# Patient Record
Sex: MALE | Race: ASIAN | NOT HISPANIC OR LATINO | Employment: FULL TIME | URBAN - METROPOLITAN AREA
[De-identification: names, ages, dates, MRNs, and addresses within clinical notes are randomized per-mention and may not be internally consistent; named-entity substitution may affect disease eponyms.]

---

## 2017-04-14 ENCOUNTER — GENERIC CONVERSION - ENCOUNTER (OUTPATIENT)
Dept: OTHER | Facility: OTHER | Age: 37
End: 2017-04-14

## 2017-04-14 ENCOUNTER — ALLSCRIPTS OFFICE VISIT (OUTPATIENT)
Dept: OTHER | Facility: OTHER | Age: 37
End: 2017-04-14

## 2017-04-14 DIAGNOSIS — R35.0 FREQUENCY OF MICTURITION: ICD-10-CM

## 2017-04-14 DIAGNOSIS — R39.15 URGENCY OF URINATION: ICD-10-CM

## 2017-04-14 LAB
CLARITY UR: NORMAL
COLOR UR: YELLOW
GLUCOSE (HISTORICAL): NORMAL
HGB UR QL STRIP.AUTO: NORMAL
KETONES UR STRIP-MCNC: NORMAL MG/DL
LEUKOCYTE ESTERASE UR QL STRIP: NORMAL
NITRITE UR QL STRIP: NORMAL
PH UR STRIP.AUTO: 6.5 [PH]
PROT UR STRIP-MCNC: NORMAL MG/DL
SP GR UR STRIP.AUTO: 1.02

## 2017-04-16 LAB
CULTURE RESULT (HISTORICAL): NORMAL
MISCELLANEOUS LAB TEST RESULT (HISTORICAL): NO GROWTH

## 2017-05-11 ENCOUNTER — HOSPITAL ENCOUNTER (OUTPATIENT)
Dept: ULTRASOUND IMAGING | Facility: HOSPITAL | Age: 37
Discharge: HOME | End: 2017-05-11
Attending: UROLOGY
Payer: COMMERCIAL

## 2017-05-11 DIAGNOSIS — R35.0 FREQUENCY OF MICTURITION: ICD-10-CM

## 2017-05-11 PROCEDURE — 76770 US EXAM ABDO BACK WALL COMP: CPT

## 2017-05-11 PROCEDURE — 51798 US URINE CAPACITY MEASURE: CPT

## 2017-06-29 ENCOUNTER — ALLSCRIPTS OFFICE VISIT (OUTPATIENT)
Dept: OTHER | Facility: OTHER | Age: 37
End: 2017-06-29

## 2017-06-29 LAB
BILIRUB UR QL STRIP: NORMAL
CLARITY UR: NORMAL
COLOR UR: YELLOW
GLUCOSE (HISTORICAL): NORMAL
HGB UR QL STRIP.AUTO: NORMAL
KETONES UR STRIP-MCNC: NORMAL MG/DL
LEUKOCYTE ESTERASE UR QL STRIP: NORMAL
NITRITE UR QL STRIP: NORMAL
PH UR STRIP.AUTO: 6.5 [PH]
PROT UR STRIP-MCNC: NORMAL MG/DL
SP GR UR STRIP.AUTO: 1.01
UROBILINOGEN UR QL STRIP.AUTO: NORMAL

## 2018-01-12 VITALS
WEIGHT: 135.38 LBS | HEART RATE: 74 BPM | BODY MASS INDEX: 21.25 KG/M2 | DIASTOLIC BLOOD PRESSURE: 60 MMHG | HEIGHT: 67 IN | SYSTOLIC BLOOD PRESSURE: 102 MMHG

## 2018-01-12 VITALS
HEART RATE: 70 BPM | SYSTOLIC BLOOD PRESSURE: 122 MMHG | DIASTOLIC BLOOD PRESSURE: 84 MMHG | BODY MASS INDEX: 20.92 KG/M2 | HEIGHT: 68 IN | WEIGHT: 138 LBS

## 2018-03-08 ENCOUNTER — TRANSCRIBE ORDERS (OUTPATIENT)
Dept: RADIOLOGY | Facility: CLINIC | Age: 38
End: 2018-03-08

## 2018-03-08 ENCOUNTER — APPOINTMENT (OUTPATIENT)
Dept: RADIOLOGY | Facility: CLINIC | Age: 38
End: 2018-03-08
Payer: COMMERCIAL

## 2018-03-08 DIAGNOSIS — M25.551 RIGHT HIP PAIN: ICD-10-CM

## 2018-03-08 DIAGNOSIS — M25.551 RIGHT HIP PAIN: Primary | ICD-10-CM

## 2018-03-08 PROCEDURE — 73502 X-RAY EXAM HIP UNI 2-3 VIEWS: CPT

## 2018-07-06 ENCOUNTER — OFFICE VISIT (OUTPATIENT)
Dept: UROLOGY | Facility: CLINIC | Age: 38
End: 2018-07-06
Payer: COMMERCIAL

## 2018-07-06 VITALS
DIASTOLIC BLOOD PRESSURE: 80 MMHG | SYSTOLIC BLOOD PRESSURE: 120 MMHG | WEIGHT: 137.8 LBS | HEIGHT: 68 IN | BODY MASS INDEX: 20.88 KG/M2 | HEART RATE: 62 BPM

## 2018-07-06 DIAGNOSIS — R39.15 URINARY URGENCY: Primary | ICD-10-CM

## 2018-07-06 DIAGNOSIS — R35.0 INCREASED FREQUENCY OF URINATION: ICD-10-CM

## 2018-07-06 PROCEDURE — 99213 OFFICE O/P EST LOW 20 MIN: CPT | Performed by: PHYSICIAN ASSISTANT

## 2018-07-06 PROCEDURE — 51798 US URINE CAPACITY MEASURE: CPT | Performed by: PHYSICIAN ASSISTANT

## 2018-07-06 RX ORDER — SIMVASTATIN 10 MG
1 TABLET ORAL DAILY
COMMUNITY
Start: 2018-06-12 | End: 2021-03-26

## 2018-07-06 NOTE — PROGRESS NOTES
1  Urinary urgency     2  Increased frequency of urination         Assessment and plan:       1  Urinary urgency and frequency- managed by dr Nidhi Restrepo  - I again reviewe with the patient that he is emptying his bladder excellently  We discussed symptom management with avoidance of bladder irritants, timed voiding, and double voiding as needed  We briefly discussed initiation of anticholinergics however patient symptoms are not severe enough at this time and he is not interested in initiation of medication  Patient will follow up with Urology on an as-needed basis  He is aware to contact us in the future should his symptoms worsen  All questions answered  Stan Sadler PA-C      Chief Complaint     F/u LUTS    History of Present Illness     Pee Blank is a 43 y o  male patient of Dr Anabella Moise with a history of lower urinary tract symptoms presenting for follow-up  Patient seen urinary symptoms in the past include urinary frequency, urgency, and weak stream   Patient has reported need for severe urgency to the bathroom and the need to double void  Patient has had an ultrasound of his kidney and bladder which was negative for any urologic abnormalities  Patient has demonstrated excellent bladder emptying on postvoid imaging  Over the past year patient states his symptoms remain intermittent in nature  The most part he feels like he has an okay stream, feels empty after urination, denies nocturia  He will admit to urinary urgency every few days  He denies any urinary incontinence  He denies any precipitating or alleviating factors  Denies any dysuria, gross hematuria, suprapubic pressure flank pain, fevers, chills, bone pain, weight loss      Postvoid residual my office today reveals excellent bladder emptying at 18 mL      Laboratory         Review of Systems     Review of Systems   Constitutional: Negative for activity change, appetite change, chills, diaphoresis, fatigue, fever and unexpected weight change  Respiratory: Negative for chest tightness and shortness of breath  Cardiovascular: Negative for chest pain, palpitations and leg swelling  Gastrointestinal: Negative for abdominal distention, abdominal pain, constipation, diarrhea, nausea and vomiting  Genitourinary: Positive for frequency and urgency  Negative for decreased urine volume, difficulty urinating, dysuria, enuresis, flank pain, genital sores and hematuria  Musculoskeletal: Negative for back pain, gait problem and myalgias  Skin: Negative for color change, pallor, rash and wound  Psychiatric/Behavioral: Negative for behavioral problems  The patient is not nervous/anxious  Allergies     No Known Allergies    Physical Exam     Physical Exam   Constitutional: He is oriented to person, place, and time  He appears well-developed and well-nourished  No distress  HENT:   Head: Normocephalic and atraumatic  Eyes: Conjunctivae are normal    Neck: Normal range of motion  No tracheal deviation present  Pulmonary/Chest: Effort normal    Musculoskeletal: Normal range of motion  He exhibits no edema or deformity  Neurological: He is alert and oriented to person, place, and time  Skin: Skin is warm and dry  No rash noted  He is not diaphoretic  No erythema  Psychiatric: He has a normal mood and affect  His behavior is normal          Vital Signs     Vitals:    07/06/18 1258   BP: 120/80   BP Location: Left arm   Patient Position: Sitting   Cuff Size: Adult   Pulse: 62   Weight: 62 5 kg (137 lb 12 8 oz)   Height: 5' 8" (1 727 m)         Current Medications       Current Outpatient Prescriptions:     simvastatin (ZOCOR) 10 mg tablet, , Disp: , Rfl:       Active Problems     Patient Active Problem List   Diagnosis    Urinary urgency    Increased frequency of urination         Past Medical History     History reviewed  No pertinent past medical history        Surgical History     History reviewed  No pertinent surgical history        Family History     Family History   Problem Relation Age of Onset    Stroke Father          Social History     Social History       Radiology

## 2019-04-05 ENCOUNTER — CONSULT (OUTPATIENT)
Dept: HEMATOLOGY ONCOLOGY | Facility: CLINIC | Age: 39
End: 2019-04-05
Payer: COMMERCIAL

## 2019-04-05 VITALS
SYSTOLIC BLOOD PRESSURE: 114 MMHG | WEIGHT: 135 LBS | BODY MASS INDEX: 20.46 KG/M2 | RESPIRATION RATE: 16 BRPM | HEIGHT: 68 IN | DIASTOLIC BLOOD PRESSURE: 76 MMHG | TEMPERATURE: 97.9 F | HEART RATE: 89 BPM

## 2019-04-05 DIAGNOSIS — D70.9 NEUTROPENIA, UNSPECIFIED TYPE (HCC): Primary | ICD-10-CM

## 2019-04-05 PROCEDURE — 99244 OFF/OP CNSLTJ NEW/EST MOD 40: CPT | Performed by: INTERNAL MEDICINE

## 2019-04-05 RX ORDER — FERROUS SULFATE 325(65) MG
325 TABLET ORAL 2 TIMES WEEKLY
COMMUNITY
End: 2021-03-02 | Stop reason: ALTCHOICE

## 2019-04-11 ENCOUNTER — HOSPITAL ENCOUNTER (OUTPATIENT)
Dept: ULTRASOUND IMAGING | Facility: HOSPITAL | Age: 39
Discharge: HOME | End: 2019-04-11
Attending: INTERNAL MEDICINE
Payer: COMMERCIAL

## 2019-04-11 DIAGNOSIS — D70.9 NEUTROPENIA, UNSPECIFIED TYPE (HCC): ICD-10-CM

## 2019-04-11 PROCEDURE — 76705 ECHO EXAM OF ABDOMEN: CPT

## 2019-04-16 LAB
ANA SER QL IF: NEGATIVE
BASOPHILS # BLD AUTO: 20 CELLS/UL (ref 0–200)
BASOPHILS NFR BLD AUTO: 0.7 %
EOSINOPHIL # BLD AUTO: 41 CELLS/UL (ref 15–500)
EOSINOPHIL NFR BLD AUTO: 1.4 %
ERYTHROCYTE [DISTWIDTH] IN BLOOD BY AUTOMATED COUNT: 12.8 % (ref 11–15)
ERYTHROCYTE [SEDIMENTATION RATE] IN BLOOD BY WESTERGREN METHOD: 6 MM/H
HCT VFR BLD AUTO: 43.3 % (ref 38.5–50)
HGB BLD-MCNC: 14.8 G/DL (ref 13.2–17.1)
LDH SERPL-CCNC: 132 U/L (ref 100–220)
LYMPHOCYTES # BLD AUTO: 1070 CELLS/UL (ref 850–3900)
LYMPHOCYTES NFR BLD AUTO: 36.9 %
MCH RBC QN AUTO: 29.3 PG (ref 27–33)
MCHC RBC AUTO-ENTMCNC: 34.2 G/DL (ref 32–36)
MCV RBC AUTO: 85.7 FL (ref 80–100)
MONOCYTES # BLD AUTO: 249 CELLS/UL (ref 200–950)
MONOCYTES NFR BLD AUTO: 8.6 %
NEUTROPHILS # BLD AUTO: 1520 CELLS/UL (ref 1500–7800)
NEUTROPHILS NFR BLD AUTO: 52.4 %
PLATELET # BLD AUTO: 191 THOUSAND/UL (ref 140–400)
PMV BLD REES-ECKER: 9.5 FL (ref 7.5–12.5)
RBC # BLD AUTO: 5.05 MILLION/UL (ref 4.2–5.8)
RHEUMATOID FACT SERPL-ACNC: <14 IU/ML
WBC # BLD AUTO: 2.9 THOUSAND/UL (ref 3.8–10.8)

## 2019-04-30 ENCOUNTER — DOCUMENTATION (OUTPATIENT)
Dept: HEMATOLOGY ONCOLOGY | Facility: CLINIC | Age: 39
End: 2019-04-30

## 2021-02-27 LAB
ALT SERPL-CCNC: 13 U/L (ref 9–46)
AST SERPL-CCNC: 19 U/L (ref 10–40)
BASOPHILS # BLD AUTO: 32 CELLS/UL (ref 0–200)
BASOPHILS NFR BLD AUTO: 0.9 %
CHOLEST SERPL-MCNC: 179 MG/DL
CHOLEST/HDLC SERPL: 2.9 (CALC)
CK SERPL-CCNC: 281 U/L (ref 44–196)
EOSINOPHIL # BLD AUTO: 60 CELLS/UL (ref 15–500)
EOSINOPHIL NFR BLD AUTO: 1.7 %
ERYTHROCYTE [DISTWIDTH] IN BLOOD BY AUTOMATED COUNT: 12.9 % (ref 11–15)
HCT VFR BLD AUTO: 43.1 % (ref 38.5–50)
HDLC SERPL-MCNC: 62 MG/DL
HGB BLD-MCNC: 14.6 G/DL (ref 13.2–17.1)
LDLC SERPL CALC-MCNC: 103 MG/DL (CALC)
LYMPHOCYTES # BLD AUTO: 1250 CELLS/UL (ref 850–3900)
LYMPHOCYTES NFR BLD AUTO: 35.7 %
MCH RBC QN AUTO: 28.7 PG (ref 27–33)
MCHC RBC AUTO-ENTMCNC: 33.9 G/DL (ref 32–36)
MCV RBC AUTO: 84.8 FL (ref 80–100)
MONOCYTES # BLD AUTO: 424 CELLS/UL (ref 200–950)
MONOCYTES NFR BLD AUTO: 12.1 %
NEUTROPHILS # BLD AUTO: 1736 CELLS/UL (ref 1500–7800)
NEUTROPHILS NFR BLD AUTO: 49.6 %
NONHDLC SERPL-MCNC: 117 MG/DL (CALC)
PLATELET # BLD AUTO: 205 THOUSAND/UL (ref 140–400)
PMV BLD REES-ECKER: 9.5 FL (ref 7.5–12.5)
RBC # BLD AUTO: 5.08 MILLION/UL (ref 4.2–5.8)
TRIGL SERPL-MCNC: 58 MG/DL
WBC # BLD AUTO: 3.5 THOUSAND/UL (ref 3.8–10.8)

## 2021-02-28 PROBLEM — D72.819 LEUKOPENIA: Status: ACTIVE | Noted: 2021-02-28

## 2021-02-28 PROBLEM — E61.1 IRON DEFICIENCY: Status: ACTIVE | Noted: 2021-02-28

## 2021-02-28 PROBLEM — R39.15 URINARY URGENCY: Status: RESOLVED | Noted: 2017-04-14 | Resolved: 2021-02-28

## 2021-02-28 PROBLEM — D70.9 NEUTROPENIA (HCC): Status: RESOLVED | Noted: 2019-04-05 | Resolved: 2021-02-28

## 2021-02-28 PROBLEM — K21.9 GE REFLUX: Status: ACTIVE | Noted: 2021-02-28

## 2021-02-28 PROBLEM — E78.00 HYPERCHOLESTEROLEMIA: Status: ACTIVE | Noted: 2021-02-28

## 2021-02-28 PROBLEM — R35.0 INCREASED FREQUENCY OF URINATION: Status: RESOLVED | Noted: 2017-04-14 | Resolved: 2021-02-28

## 2021-03-02 ENCOUNTER — OFFICE VISIT (OUTPATIENT)
Dept: INTERNAL MEDICINE CLINIC | Facility: CLINIC | Age: 41
End: 2021-03-02
Payer: COMMERCIAL

## 2021-03-02 VITALS
BODY MASS INDEX: 21.66 KG/M2 | DIASTOLIC BLOOD PRESSURE: 80 MMHG | TEMPERATURE: 98.2 F | OXYGEN SATURATION: 98 % | WEIGHT: 138 LBS | HEART RATE: 70 BPM | SYSTOLIC BLOOD PRESSURE: 122 MMHG | HEIGHT: 67 IN

## 2021-03-02 DIAGNOSIS — D72.819 LEUKOPENIA, UNSPECIFIED TYPE: ICD-10-CM

## 2021-03-02 DIAGNOSIS — Z00.00 ANNUAL PHYSICAL EXAM: Primary | ICD-10-CM

## 2021-03-02 DIAGNOSIS — R22.9 LUMP OF SKIN: ICD-10-CM

## 2021-03-02 DIAGNOSIS — E61.1 IRON DEFICIENCY: ICD-10-CM

## 2021-03-02 DIAGNOSIS — E78.00 HYPERCHOLESTEROLEMIA: ICD-10-CM

## 2021-03-02 PROCEDURE — 99396 PREV VISIT EST AGE 40-64: CPT | Performed by: INTERNAL MEDICINE

## 2021-03-02 PROCEDURE — 3725F SCREEN DEPRESSION PERFORMED: CPT | Performed by: INTERNAL MEDICINE

## 2021-03-02 PROCEDURE — 3008F BODY MASS INDEX DOCD: CPT | Performed by: INTERNAL MEDICINE

## 2021-03-02 PROCEDURE — 1036F TOBACCO NON-USER: CPT | Performed by: INTERNAL MEDICINE

## 2021-03-02 NOTE — PATIENT INSTRUCTIONS

## 2021-03-02 NOTE — ASSESSMENT & PLAN NOTE
His cholesterol 179, triglyceride 58,   Advised to continue present medication for  low-cholesterol diet

## 2021-03-02 NOTE — PROGRESS NOTES
ADULT ANNUAL [address] [facility] INTERNAL MEDICINE    NAME: Kirt Farmer  AGE: 38 y o  SEX: male  : 1980     DATE: 3/2/2021     Assessment and Plan:     Problem List Items Addressed This Visit        Other    Hypercholesterolemia     His cholesterol 179, triglyceride 58,   Advised to continue present medication for  low-cholesterol diet  Relevant Orders    Comprehensive metabolic panel    Lipid panel    Leukopenia     Patient had a hematological evaluation in the past   WBC stable  Will follow CBC         Relevant Orders    CBC and differential    Comprehensive metabolic panel    Iron deficiency     Patient stopped taking iron therapy  Will follow CBC and ferritin  Relevant Orders    CBC and differential    Ferritin    BMI 21 0-21 9, adult    Lump of skin     On both forearms most likely lipoma  Patient had lump removal  from both arms by Doc [place], and biopsy was positive for lipoma  Will observe  Other Visit Diagnoses     Annual physical exam    -  Primary          Immunizations and preventive care screenings were discussed with patient today  Appropriate education was printed on patient's after visit summary  Counseling:  · Alcohol/drug use: discussed moderation in alcohol intake, the recommendations for healthy alcohol use, and avoidance of illicit drug use  Return in about 6 months (around 2021)  Chief Complaint:     Chief Complaint   Patient presents with    Annual Exam      History of Present Illness:     Adult Annual Physical   Patient here for a comprehensive physical exam  The patient reports no problems  Diet and Physical Activity  · Diet/Nutrition: well balanced diet  · Exercise: 3-4 times a week on average        Depression Screening  PHQ-9 Depression Screening    PHQ-9:   Frequency of the following problems over the past two weeks:      Little interest or pleasure in doing things: 0 - not at all  Feeling down, depressed, or hopeless: 0 - not at all  PHQ-2 Score: 0       General Health  · Sleep: sleeps well  · Hearing: normal - bilateral   · Vision: no vision problems  · Dental: regular dental visits   Health  · Symptoms include: none     Review of Systems:     Review of Systems   Constitutional: Negative for chills, fatigue and fever  HENT: Negative for congestion, ear pain, hearing loss, nosebleeds, sinus pain, sore throat and trouble swallowing  Eyes: Negative for discharge, redness and visual disturbance  Respiratory: Negative for cough, chest tightness and shortness of breath  Cardiovascular: Negative for chest pain and palpitations  Gastrointestinal: Negative for abdominal pain, blood in stool, constipation, diarrhea, nausea and vomiting  Genitourinary: Negative for dysuria, flank pain, frequency and hematuria  Musculoskeletal: Negative for arthralgias, myalgias and neck pain  Skin: Negative for color change and rash  Complain of scatter lump on both upper extremity   Neurological: Negative for dizziness, speech difficulty, weakness and headaches  Hematological: Does not bruise/bleed easily  Psychiatric/Behavioral: Negative for agitation and behavioral problems           Past Medical History:     Past Medical History:   Diagnosis Date    BMI 21 0-21 9, adult 3/2/2021    Chest pain, atypical     Esophageal reflux     GE reflux 2/28/2021    Hypercholesterolemia 2/28/2021    Iron deficiency 2/28/2021    Leukopenia 2/28/2021    Lump of skin 3/2/2021    Nocturia     Urinary urgency       Past Surgical History:     Past Surgical History:   Procedure Laterality Date    NO PAST SURGERIES        Family History:     Family History   Problem Relation Age of Onset    Stroke Father     Coronary artery disease Father       Social History:        Social History     Socioeconomic History    Marital status: /Civil Union     Spouse name: None    Number of children: None    Years of education: None    Highest education level: None   Occupational History    Occupation: Has own business    Social Needs    Financial resource strain: None    Food insecurity     Worry: None     Inability: None    Transportation needs     Medical: None     Non-medical: None   Tobacco Use    Smoking status: Never Smoker    Smokeless tobacco: Never Used   Substance and Sexual Activity    Alcohol use: No    Drug use: No    Sexual activity: None   Lifestyle    Physical activity     Days per week: None     Minutes per session: None    Stress: None   Relationships    Social connections     Talks on phone: None     Gets together: None     Attends Hinduism service: None     Active member of club or organization: None     Attends meetings of clubs or organizations: None     Relationship status: None    Intimate partner violence     Fear of current or ex partner: None     Emotionally abused: None     Physically abused: None     Forced sexual activity: None   Other Topics Concern    None   Social History Narrative    Annual dental checkup: Advise - As per [employer]       Current Medications:     Current Outpatient Medications   Medication Sig Dispense Refill    simvastatin (ZOCOR) 10 mg tablet Take 1 tablet by mouth daily       No current facility-administered medications for this visit  Allergies:     No Known Allergies   Physical Exam:     /80 (BP Location: Right arm, Patient Position: Sitting, Cuff Size: Adult)   Pulse 70   Temp 98 2 °F (36 8 °C) (Tympanic)   Ht 5' 7" (1 702 m)   Wt 62 6 kg (138 lb)   SpO2 98%   BMI 21 61 kg/m²     Physical Exam  Vitals signs and nursing note reviewed  Constitutional:       Appearance: He is well-developed  HENT:      Head: Normocephalic and atraumatic        Right Ear: Tympanic membrane and external ear normal       Left Ear: Tympanic membrane and external ear normal       Nose: Nose normal  Mouth/Throat:      Comments: Patient has a face mask on  Eyes:      General: No scleral icterus  Extraocular Movements: Extraocular movements intact  Conjunctiva/sclera: Conjunctivae normal    Neck:      Musculoskeletal: Normal range of motion and neck supple  Cardiovascular:      Rate and Rhythm: Normal rate and regular rhythm  Pulses: Normal pulses  Heart sounds: Normal heart sounds  No murmur  Pulmonary:      Effort: Pulmonary effort is normal  No respiratory distress  Breath sounds: Normal breath sounds  Abdominal:      General: Abdomen is flat  Palpations: Abdomen is soft  There is no mass  Tenderness: There is no abdominal tenderness  Musculoskeletal: Normal range of motion  Skin:     General: Skin is warm and dry  Comments: Has small lump on both upper extremity soft, nontender somewhat mobile 1 on each forearm   Neurological:      General: No focal deficit present  Mental Status: He is alert and oriented to person, place, and time     Psychiatric:         Mood and Affect: Mood normal          Behavior: Behavior normal             Shalonda Gaxiola MD  [facility]

## 2021-03-02 NOTE — ASSESSMENT & PLAN NOTE
On both forearms most likely lipoma  Patient had lump removal 2006 from both arms by Analilia Troy, and biopsy was positive for lipoma  Will observe

## 2021-03-26 DIAGNOSIS — E78.00 PURE HYPERCHOLESTEROLEMIA, UNSPECIFIED: ICD-10-CM

## 2021-03-26 RX ORDER — SIMVASTATIN 10 MG
TABLET ORAL
Qty: 90 TABLET | Refills: 1 | Status: SHIPPED | OUTPATIENT
Start: 2021-03-26 | End: 2021-10-19

## 2021-04-13 DIAGNOSIS — Z23 ENCOUNTER FOR IMMUNIZATION: ICD-10-CM

## 2021-06-03 ENCOUNTER — TELEPHONE (OUTPATIENT)
Dept: INTERNAL MEDICINE CLINIC | Facility: CLINIC | Age: 41
End: 2021-06-03

## 2021-06-21 DIAGNOSIS — J02.9 PHARYNGITIS, UNSPECIFIED ETIOLOGY: Primary | ICD-10-CM

## 2021-06-21 RX ORDER — AZITHROMYCIN 500 MG/1
500 TABLET, FILM COATED ORAL DAILY
Qty: 3 TABLET | Refills: 0 | Status: SHIPPED | OUTPATIENT
Start: 2021-06-21 | End: 2021-06-22 | Stop reason: SDUPTHER

## 2021-06-21 NOTE — PROGRESS NOTES
Complaining of nasal congestion and sore throat for past 4 days  It is not getting any better  Some body ache  No dyspnea or chest pain  No cough  No headache  He feels some pressure in the sinus 2  Symptomatic treatment is not helping  Sometimes yellow nasal discharge  Temperature normal   Start azithromycin

## 2021-06-22 ENCOUNTER — DOCUMENTATION (OUTPATIENT)
Dept: FAMILY MEDICINE CLINIC | Facility: CLINIC | Age: 41
End: 2021-06-22

## 2021-06-22 DIAGNOSIS — J02.9 PHARYNGITIS, UNSPECIFIED ETIOLOGY: ICD-10-CM

## 2021-06-22 RX ORDER — AZITHROMYCIN 500 MG/1
500 TABLET, FILM COATED ORAL DAILY
Qty: 3 TABLET | Refills: 0 | Status: SHIPPED | OUTPATIENT
Start: 2021-06-22 | End: 2021-06-25

## 2021-08-26 ENCOUNTER — APPOINTMENT (EMERGENCY)
Dept: RADIOLOGY | Facility: HOSPITAL | Age: 41
End: 2021-08-26
Payer: COMMERCIAL

## 2021-08-26 ENCOUNTER — HOSPITAL ENCOUNTER (EMERGENCY)
Facility: HOSPITAL | Age: 41
Discharge: HOME | End: 2021-08-26
Attending: EMERGENCY MEDICINE
Payer: COMMERCIAL

## 2021-08-26 VITALS
SYSTOLIC BLOOD PRESSURE: 128 MMHG | HEART RATE: 70 BPM | TEMPERATURE: 98.4 F | DIASTOLIC BLOOD PRESSURE: 81 MMHG | RESPIRATION RATE: 16 BRPM | OXYGEN SATURATION: 98 %

## 2021-08-26 DIAGNOSIS — R07.9 CHEST PAIN: Primary | ICD-10-CM

## 2021-08-26 LAB
ALBUMIN SERPL BCP-MCNC: 3.7 G/DL (ref 3.5–5)
ALP SERPL-CCNC: 66 U/L (ref 46–116)
ALT SERPL W P-5'-P-CCNC: 29 U/L (ref 12–78)
ANION GAP SERPL CALCULATED.3IONS-SCNC: 5 MMOL/L (ref 4–13)
AST SERPL W P-5'-P-CCNC: 19 U/L (ref 5–45)
ATRIAL RATE: 52 BPM
ATRIAL RATE: 59 BPM
BASOPHILS # BLD AUTO: 0.02 THOUSANDS/ΜL (ref 0–0.1)
BASOPHILS NFR BLD AUTO: 1 % (ref 0–1)
BILIRUB SERPL-MCNC: 0.35 MG/DL (ref 0.2–1)
BUN SERPL-MCNC: 16 MG/DL (ref 5–25)
CALCIUM SERPL-MCNC: 9 MG/DL (ref 8.3–10.1)
CHLORIDE SERPL-SCNC: 107 MMOL/L (ref 100–108)
CO2 SERPL-SCNC: 30 MMOL/L (ref 21–32)
CREAT SERPL-MCNC: 1.08 MG/DL (ref 0.6–1.3)
EOSINOPHIL # BLD AUTO: 0.08 THOUSAND/ΜL (ref 0–0.61)
EOSINOPHIL NFR BLD AUTO: 2 % (ref 0–6)
ERYTHROCYTE [DISTWIDTH] IN BLOOD BY AUTOMATED COUNT: 12.8 % (ref 11.6–15.1)
GFR SERPL CREATININE-BSD FRML MDRD: 84 ML/MIN/1.73SQ M
GLUCOSE SERPL-MCNC: 102 MG/DL (ref 65–140)
HCT VFR BLD AUTO: 45 % (ref 36.5–49.3)
HGB BLD-MCNC: 14.9 G/DL (ref 12–17)
IMM GRANULOCYTES # BLD AUTO: 0.01 THOUSAND/UL (ref 0–0.2)
IMM GRANULOCYTES NFR BLD AUTO: 0 % (ref 0–2)
LYMPHOCYTES # BLD AUTO: 1.48 THOUSANDS/ΜL (ref 0.6–4.47)
LYMPHOCYTES NFR BLD AUTO: 42 % (ref 14–44)
MCH RBC QN AUTO: 28.4 PG (ref 26.8–34.3)
MCHC RBC AUTO-ENTMCNC: 33.1 G/DL (ref 31.4–37.4)
MCV RBC AUTO: 86 FL (ref 82–98)
MONOCYTES # BLD AUTO: 0.42 THOUSAND/ΜL (ref 0.17–1.22)
MONOCYTES NFR BLD AUTO: 12 % (ref 4–12)
NEUTROPHILS # BLD AUTO: 1.53 THOUSANDS/ΜL (ref 1.85–7.62)
NEUTS SEG NFR BLD AUTO: 43 % (ref 43–75)
NRBC BLD AUTO-RTO: 0 /100 WBCS
P AXIS: 45 DEGREES
P AXIS: 54 DEGREES
PLATELET # BLD AUTO: 182 THOUSANDS/UL (ref 149–390)
PMV BLD AUTO: 9.2 FL (ref 8.9–12.7)
POTASSIUM SERPL-SCNC: 4.4 MMOL/L (ref 3.5–5.3)
PR INTERVAL: 162 MS
PR INTERVAL: 172 MS
PROT SERPL-MCNC: 6.9 G/DL (ref 6.4–8.2)
QRS AXIS: 62 DEGREES
QRS AXIS: 64 DEGREES
QRSD INTERVAL: 76 MS
QRSD INTERVAL: 84 MS
QT INTERVAL: 416 MS
QT INTERVAL: 416 MS
QTC INTERVAL: 386 MS
QTC INTERVAL: 411 MS
RBC # BLD AUTO: 5.25 MILLION/UL (ref 3.88–5.62)
SODIUM SERPL-SCNC: 142 MMOL/L (ref 136–145)
T WAVE AXIS: 59 DEGREES
T WAVE AXIS: 70 DEGREES
TROPONIN I SERPL-MCNC: <0.02 NG/ML
TROPONIN I SERPL-MCNC: <0.02 NG/ML
VENTRICULAR RATE: 52 BPM
VENTRICULAR RATE: 59 BPM
WBC # BLD AUTO: 3.54 THOUSAND/UL (ref 4.31–10.16)

## 2021-08-26 PROCEDURE — 36415 COLL VENOUS BLD VENIPUNCTURE: CPT | Performed by: EMERGENCY MEDICINE

## 2021-08-26 PROCEDURE — 99284 EMERGENCY DEPT VISIT MOD MDM: CPT | Performed by: EMERGENCY MEDICINE

## 2021-08-26 PROCEDURE — 80053 COMPREHEN METABOLIC PANEL: CPT | Performed by: EMERGENCY MEDICINE

## 2021-08-26 PROCEDURE — 99285 EMERGENCY DEPT VISIT HI MDM: CPT

## 2021-08-26 PROCEDURE — 93010 ELECTROCARDIOGRAM REPORT: CPT | Performed by: INTERNAL MEDICINE

## 2021-08-26 PROCEDURE — 71045 X-RAY EXAM CHEST 1 VIEW: CPT

## 2021-08-26 PROCEDURE — 93005 ELECTROCARDIOGRAM TRACING: CPT

## 2021-08-26 PROCEDURE — 84484 ASSAY OF TROPONIN QUANT: CPT | Performed by: EMERGENCY MEDICINE

## 2021-08-26 PROCEDURE — 85025 COMPLETE CBC W/AUTO DIFF WBC: CPT | Performed by: EMERGENCY MEDICINE

## 2021-08-26 NOTE — DISCHARGE INSTRUCTIONS
Please make sure to follow-up with family doctor in the next 3 days  Return to ER if feeling worse or any new concerning symptoms

## 2021-08-26 NOTE — ED PROVIDER NOTES
History  Chief Complaint   Patient presents with    Chest Pain     pt woke up to go to bathroom and felt chest heaviness  feeling is resolved now       History provided by:  Patient   used: No      Patient is a 21-year-old male presenting to emergency department with chest heaviness today morning  Patient states woke up today morning, went to the bathroom, on the way back from bathroom started having chest heaviness  Self-resolved 5 minutes  Associated with mild shortness of breath  No neck or jaw pain  No lightheadedness or dizziness  Mildly sweaty  No fevers or chills  No abdominal pain  No diarrhea  No history of cardiac disease  No recent stress test or catheterization  No drug use  No smoking  No hypertension or diabetes  Has hyperlipidemia  Parents had MI in their late 46s  No PE or DVT risk factors  Wells low  PERC score 0  MDM will do cardiac evaluation, likely candidate for repeat EKG troponin if 1st ones normal      Prior to Admission Medications   Prescriptions Last Dose Informant Patient Reported? Taking?   simvastatin (ZOCOR) 10 mg tablet   No No   Sig: TAKE 1 TABLET BY MOUTH EVERY DAY      Facility-Administered Medications: None       Past Medical History:   Diagnosis Date    BMI 21 0-21 9, adult 3/2/2021    Chest pain, atypical     Esophageal reflux     GE reflux 2/28/2021    Hypercholesterolemia 2/28/2021    Iron deficiency 2/28/2021    Leukopenia 2/28/2021    Lump of skin 3/2/2021    Nocturia     Urinary urgency        Past Surgical History:   Procedure Laterality Date    NO PAST SURGERIES         Family History   Problem Relation Age of Onset    Stroke Father     Coronary artery disease Father      I have reviewed and agree with the history as documented      E-Cigarette/Vaping     E-Cigarette/Vaping Substances     Social History     Tobacco Use    Smoking status: Never Smoker    Smokeless tobacco: Never Used   Substance Use Topics    Alcohol use: No    Drug use: No       Review of Systems   Constitutional: Negative for chills, diaphoresis and fever  HENT: Negative for congestion and sore throat  Respiratory: Positive for shortness of breath  Negative for cough, wheezing and stridor  Cardiovascular: Positive for chest pain  Negative for palpitations and leg swelling  Gastrointestinal: Negative for abdominal pain, blood in stool, diarrhea, nausea and vomiting  Genitourinary: Negative for dysuria, frequency and urgency  Musculoskeletal: Negative for neck pain and neck stiffness  Skin: Negative for pallor and rash  Neurological: Negative for dizziness, syncope, weakness, light-headedness and headaches  All other systems reviewed and are negative  Physical Exam  Physical Exam  Vitals reviewed  Constitutional:       Appearance: He is well-developed  HENT:      Head: Normocephalic and atraumatic  Eyes:      Pupils: Pupils are equal, round, and reactive to light  Cardiovascular:      Rate and Rhythm: Normal rate and regular rhythm  Heart sounds: Normal heart sounds  Pulmonary:      Effort: Pulmonary effort is normal  No respiratory distress  Breath sounds: Normal breath sounds  Abdominal:      General: Bowel sounds are normal       Palpations: Abdomen is soft  Tenderness: There is no abdominal tenderness  Musculoskeletal:         General: Normal range of motion  Cervical back: Neck supple  Right lower leg: No tenderness  No edema  Left lower leg: No tenderness  No edema  Skin:     General: Skin is warm and dry  Capillary Refill: Capillary refill takes less than 2 seconds  Neurological:      General: No focal deficit present  Mental Status: He is alert and oriented to person, place, and time           Vital Signs  ED Triage Vitals [08/26/21 0701]   Temperature Pulse Respirations Blood Pressure SpO2   98 4 °F (36 9 °C) 58 18 166/87 100 %      Temp Source Heart Rate Source Patient Position - Orthostatic VS BP Location FiO2 (%)   Oral Monitor Sitting Right arm --      Pain Score       No Pain           Vitals:    08/26/21 0701 08/26/21 0900 08/26/21 1000   BP: 166/87 137/85 128/81   Pulse: 58 56 70   Patient Position - Orthostatic VS: Sitting           Visual Acuity      ED Medications  Medications - No data to display    Diagnostic Studies  Results Reviewed     Procedure Component Value Units Date/Time    Troponin I repeat in 3hrs [[id number]]  (Normal) Collected: 08/26/21 1017    Lab Status: Final result Specimen: Blood from Arm, Right Updated: 08/26/21 1049     Troponin I <0 02 ng/mL     Comprehensive metabolic panel [[id number]] Collected: 08/26/21 0732    Lab Status: Final result Specimen: Blood from Arm, Right Updated: 08/26/21 9302     Sodium 142 mmol/L      Potassium 4 4 mmol/L      Chloride 107 mmol/L      CO2 30 mmol/L      ANION GAP 5 mmol/L      BUN 16 mg/dL      Creatinine 1 08 mg/dL      Glucose 102 mg/dL      Calcium 9 0 mg/dL      AST 19 U/L      ALT 29 U/L      Alkaline Phosphatase 66 U/L      Total Protein 6 9 g/dL      Albumin 3 7 g/dL      Total Bilirubin 0 35 mg/dL      eGFR 84 ml/min/1 73sq m     Narrative:      [place] guidelines for Chronic Kidney Disease (CKD):     Stage 1 with normal or high GFR (GFR > 90 mL/min/1 73 square meters)    Stage 2 Mild CKD (GFR = 60-89 mL/min/1 73 square meters)    Stage 3A Moderate CKD (GFR = 45-59 mL/min/1 73 square meters)    Stage 3B Moderate CKD (GFR = 30-44 mL/min/1 73 square meters)    Stage 4 Severe CKD (GFR = 15-29 mL/min/1 73 square meters)    Stage 5 End Stage CKD (GFR <15 mL/min/1 73 square meters)  Note: GFR calculation is accurate only with a steady state creatinine    Troponin I [[id number]]  (Normal) Collected: 08/26/21 0732    Lab Status: Final result Specimen: Blood from Arm, Right Updated: 08/26/21 0820     Troponin I <0 02 ng/mL     CBC and differential [[id number]]  (Abnormal) Collected: 08/26/21 0732    Lab Status: Final result Specimen: Blood from Arm, Right Updated: 08/26/21 0748     WBC 3 54 Thousand/uL      RBC 5 25 Million/uL      Hemoglobin 14 9 g/dL      Hematocrit 45 0 %      MCV 86 fL      MCH 28 4 pg      MCHC 33 1 g/dL      RDW 12 8 %      MPV 9 2 fL      Platelets 249 Thousands/uL      nRBC 0 /100 WBCs      Neutrophils Relative 43 %      Immat GRANS % 0 %      Lymphocytes Relative 42 %      Monocytes Relative 12 %      Eosinophils Relative 2 %      Basophils Relative 1 %      Neutrophils Absolute 1 53 Thousands/µL      Immature Grans Absolute 0 01 Thousand/uL      Lymphocytes Absolute 1 48 Thousands/µL      Monocytes Absolute 0 42 Thousand/µL      Eosinophils Absolute 0 08 Thousand/µL      Basophils Absolute 0 02 Thousands/µL                  XR chest 1 view portable   ED Interpretation by Camille Parker MD (08/26 1890)   No acute abnormality      Final Result by Eduard Hunt MD (08/26 2030)      No acute cardiopulmonary disease  Workstation performed: [id number]                    Procedures  Procedures         ED Course  ED Course as of Aug 26 1411   Thu Aug 26, 2021   0725 ECG shows rate of 52, sinus, normal axis, normal QRS, no significant ST or T-wave changes, normal intervals, independently interpreted by me      0822 Patient reports his white count is always low  This is normal for him  WBC(!): 3 54   0911 Patient re-evaluated  No complaints        1026 Repeat ECG shows rate of 59, sinus, normal axis, normal QRS, no significant ST or T-wave changes, normal intervals, no significant changes from previous EKG from 3 hours ago, independently interpreted by me                HEART Risk Score      Most Recent Value   Heart Score Risk Calculator   History  0 Filed at: 08/26/2021 1052   ECG  1 Filed at: 08/26/2021 1052   Age  0 Filed at: 08/26/2021 1052   Risk Factors  1 Filed at: 08/26/2021 1052   Troponin  0 Filed at: 08/26/2021 1052   HEART Score  2 Filed at: 08/26/2021 1052                                    MDM    Disposition  Final diagnoses:   Chest pain     Time reflects when diagnosis was documented in both MDM as applicable and the Disposition within this note     Time User Action Codes Description Comment    8/26/2021 10:53 AM Wayne Felix Thania Lance [R07 9] Chest pain       ED Disposition     ED Disposition Condition Date/Time Comment    Discharge Stable Thu Aug 26, 2021 10:53 AM Dona Hoes discharge to home/self care  Follow-up Information     Follow up With Specialties Details Why Contact Info Additional Information    Kwame Noel MD Internal Medicine In 2 days [facility] [address] [address]  [address]  Emergency Department Emergency Medicine  As needed, If symptoms worsen [address] Emergency Department, [address], [place], [place], [address]          Discharge Medication List as of 8/26/2021 10:53 AM      CONTINUE these medications which have NOT CHANGED    Details   simvastatin (ZOCOR) 10 mg tablet TAKE 1 TABLET BY MOUTH EVERY DAY, Normal           No discharge procedures on file      PDMP Review     None          ED Provider  Electronically Signed by           Justino Gregg MD  08/26/21 1236

## 2021-09-13 ENCOUNTER — TELEPHONE (OUTPATIENT)
Dept: GASTROENTEROLOGY | Facility: CLINIC | Age: 41
End: 2021-09-13

## 2021-09-13 NOTE — TELEPHONE ENCOUNTER
Patient had left message asking for a call to schedule an appointment with Dr Michelle Chaparro  I returned call and had to leave message for patient to call back

## 2021-09-14 NOTE — TELEPHONE ENCOUNTER
Patient left another message asking for a call to schedule an appointment with Dr Chilo Bull  I returned his call and had to leave another message for him to call back

## 2021-09-21 ENCOUNTER — TELEPHONE (OUTPATIENT)
Dept: INTERNAL MEDICINE CLINIC | Facility: CLINIC | Age: 41
End: 2021-09-21

## 2021-09-21 ENCOUNTER — OFFICE VISIT (OUTPATIENT)
Dept: INTERNAL MEDICINE CLINIC | Facility: CLINIC | Age: 41
End: 2021-09-21
Payer: COMMERCIAL

## 2021-09-21 VITALS
WEIGHT: 139 LBS | HEIGHT: 67 IN | HEART RATE: 62 BPM | OXYGEN SATURATION: 98 % | DIASTOLIC BLOOD PRESSURE: 78 MMHG | BODY MASS INDEX: 21.82 KG/M2 | TEMPERATURE: 98.4 F | SYSTOLIC BLOOD PRESSURE: 120 MMHG

## 2021-09-21 DIAGNOSIS — R22.9 LUMPS ON THE SKIN: ICD-10-CM

## 2021-09-21 DIAGNOSIS — E61.1 IRON DEFICIENCY: ICD-10-CM

## 2021-09-21 DIAGNOSIS — Z79.899 HIGH RISK MEDICATION USE: ICD-10-CM

## 2021-09-21 DIAGNOSIS — Z11.59 NEED FOR HEPATITIS C SCREENING TEST: ICD-10-CM

## 2021-09-21 DIAGNOSIS — D72.819 LEUKOPENIA, UNSPECIFIED TYPE: ICD-10-CM

## 2021-09-21 DIAGNOSIS — R07.9 CHEST PAIN, UNSPECIFIED TYPE: ICD-10-CM

## 2021-09-21 DIAGNOSIS — E78.00 HYPERCHOLESTEROLEMIA: Primary | ICD-10-CM

## 2021-09-21 DIAGNOSIS — M79.661 PAIN IN BOTH LOWER LEGS: ICD-10-CM

## 2021-09-21 DIAGNOSIS — K21.9 GASTROESOPHAGEAL REFLUX DISEASE WITHOUT ESOPHAGITIS: ICD-10-CM

## 2021-09-21 DIAGNOSIS — M79.662 PAIN IN BOTH LOWER LEGS: ICD-10-CM

## 2021-09-21 PROCEDURE — 99214 OFFICE O/P EST MOD 30 MIN: CPT | Performed by: INTERNAL MEDICINE

## 2021-09-21 NOTE — PATIENT INSTRUCTIONS
Patient advised to continue present medications discussed with the patient medications and laboratory data in detail  Follow-up with me in 3 months    If any blood test was ordered please do 1 week prior to next appointment  If you have any questions please call the office [phone]

## 2021-09-21 NOTE — TELEPHONE ENCOUNTER
Pt called - an insurance referral is needed for pt's 11/11 appt to see [place] [address] [facility]- pt has [facility] - NPI of Dr Henrique Fleming is [phone] - waiting on Dx codes from billing office since pt has not established care yet      Lucy Jordan from [facility] [address] [facility] billing called back and LM - she will not be back in the office until tomorrow 9/22 - please call in AM for Dx codes for insurance referral - her direct number is [phone]

## 2021-09-21 NOTE — PROGRESS NOTES
Assessment/Plan:    1  Hypercholesterolemia  Assessment & Plan:  Well controlled  Cholesterol 179, triglyceride 58, HDL 62,   Advised to continue simvastatin and low-cholesterol diet  Orders:  -     CK; Future  -     TSH, 3rd generation; Future  -     Lipid panel; Future    2  Leukopenia, unspecified type  Assessment & Plan:  Chronic and stable  Had  an evaluation by hematologist   Will follow and observe    Orders:  -     CBC and differential; Future    3  BMI 21 0-21 9, adult    4  Iron deficiency  Assessment & Plan:  Patient used  to take iron therapy but he stopped taking iron  supplement  Hemoglobin normal   Will follow ferritin level and CBC  Orders:  -     Ferritin; Future    5  Pain in both lower legs  Assessment & Plan:  He is having a pain in both lower legs at night  Rule out any metabolic problem versus rule out secondary to statin therapy  Will check blood test 1st   If unremarkable may need to discontinue statin therapy  Patient started taking over-the-counter magnesium supplement  Orders:  -     CBC and differential; Future  -     Ferritin; Future  -     Magnesium; Future  -     CK; Future  -     TSH, 3rd generation; Future    6  Chest pain, unspecified type  Assessment & Plan: He went to emergency room August 26, 2021 with chest pain like a chest heaviness  Had a workup including troponin and EKG was unremarkable  Patient's father had coronary artery disease in his 46s  Patient is worrying about heart disease so will refer to cardiologist for further evaluation  asymptomatic since he left emergency room  Except occasionally gets reflux most likely GI related  Orders:  -     Ambulatory referral to Cardiology; Future    7  Lumps on the skin  Assessment & Plan:  Patient has scattered lumps  on both upper extremities  He had an evaluation by General surgery in the past had biopsy has most likely lipoma    Patient feels lately lumps are increasing in size and would like to follow-up with general surgery so will schedule  Orders:  -     Ambulatory referral to General Surgery; Future    8  High risk medication use  -     CK; Future    9  Need for hepatitis C screening test  -     Hepatitis C antibody; Future    10  Gastroesophageal reflux disease without esophagitis  Assessment & Plan:  Sometime he gets GE reflux  Although he does not take any medication  Patient stated he will watch diet very closely and would like to avoid medication if possible  Patient made an appointment to see gastroenterologist            Subjective:  Patient presents for follow-up  Patient ID: Gianfranco Cam is a 42 y o  male  HPI   79-year-old male patient presents for follow-up of his medical problems  He had an episode of chest pain like chest heaviness August 26, 2021 for which he went to emergency room for further evaluation  Occasionally gets heartburn like a reflux  No further chest heaviness  No chest pain  Denies any shortness of breath cough dysphagia  Denies any nausea, vomiting, or pain abdomen  Gets loose BM 2 times per day  Denies any blood in the stool or dark colored stool  Has lumps on the upper extremities feels like increasing size    The following portions of the patient's history were reviewed and updated as appropriate:     Past Medical History:  He has a past medical history of BMI 21 0-21 9, adult (3/2/2021), Chest pain (9/21/2021), Chest pain, atypical, Esophageal reflux, GE reflux (2/28/2021), Hypercholesterolemia (2/28/2021), Iron deficiency (2/28/2021), Leukopenia (2/28/2021), Lump of skin (3/2/2021), Lumps on the skin (9/21/2021), Nocturia, Pain in both lower legs (9/21/2021), and Urinary urgency  ,  _______________________________________________________________________  Past Surgical History:   has a past surgical history that includes No past surgeries  ,  _______________________________________________________________________  Family History:  family history includes Coronary artery disease in his father; Stroke in his father ,  _______________________________________________________________________  Social History:   reports that he has never smoked  He has never used smokeless tobacco  He reports that he does not drink alcohol and does not use drugs  ,  _______________________________________________________________________  Allergies:  has No Known Allergies     _______________________________________________________________________  Current Outpatient Medications   Medication Sig Dispense Refill    Magnesium Chloride (MAG-SR PO) Take 4 tablets by mouth daily MAG SRT  Has a [id number], vitamin O19 6mcgand, folic [id number] and vitamin B65mg/tab      simvastatin (ZOCOR) 10 mg tablet TAKE 1 TABLET BY MOUTH EVERY DAY 90 tablet 1     No current facility-administered medications for this visit      _______________________________________________________________________  Review of Systems   Constitutional: Negative for chills, fatigue and fever  HENT: Negative for congestion, ear pain, hearing loss, nosebleeds, sinus pain, sore throat and trouble swallowing  Eyes: Negative for discharge, redness and visual disturbance  Respiratory: Negative for cough, chest tightness and shortness of breath  Cardiovascular: Negative for chest pain and palpitations  Gastrointestinal: Negative for abdominal pain, blood in stool, constipation, diarrhea, nausea and vomiting  Genitourinary: Negative for dysuria, flank pain, frequency and hematuria  Musculoskeletal: Negative for arthralgias, myalgias and neck pain  Skin: Negative for color change and rash  Neurological: Negative for dizziness, speech difficulty, weakness and headaches  Hematological: Does not bruise/bleed easily  Psychiatric/Behavioral: Negative for agitation and behavioral problems             Objective:  Vitals:    09/21/21 1353   BP: 120/78   BP Location: Right arm   Patient Position: Sitting Cuff Size: Adult   Pulse: 62   Temp: 98 4 °F (36 9 °C)   TempSrc: Tympanic   SpO2: 98%   Weight: 63 kg (139 lb)   Height: 5' 7" (1 702 m)     Body mass index is 21 77 kg/m²  Physical Exam  Vitals and nursing note reviewed  Constitutional:       General: He is not in acute distress  Appearance: Normal appearance  HENT:      Head: Normocephalic and atraumatic  Right Ear: Ear canal and external ear normal       Left Ear: Ear canal and external ear normal       Mouth/Throat:      Comments: Patient has a face mask on  Eyes:      General: No scleral icterus  Right eye: No discharge  Left eye: No discharge  Extraocular Movements: Extraocular movements intact  Conjunctiva/sclera: Conjunctivae normal    Cardiovascular:      Rate and Rhythm: Normal rate and regular rhythm  Pulses: Normal pulses  Heart sounds: No murmur heard  Pulmonary:      Effort: Pulmonary effort is normal  No respiratory distress  Breath sounds: Normal breath sounds  Abdominal:      General: Bowel sounds are normal       Palpations: Abdomen is soft  Tenderness: There is no abdominal tenderness  Musculoskeletal:         General: Normal range of motion  Cervical back: Normal range of motion and neck supple  No muscular tenderness  Right lower leg: No edema  Left lower leg: No edema  Skin:     General: Skin is warm  Findings: No rash  Neurological:      General: No focal deficit present  Mental Status: He is alert and oriented to person, place, and time  Psychiatric:         Mood and Affect: Mood normal          Behavior: Behavior normal        I spent 30 minutes with the patient today    More than 50% time spent for reviewing of external notes, reviewing of the results of diagnostics test, management of care, patient education and ordering of test

## 2021-09-22 NOTE — ASSESSMENT & PLAN NOTE
Well controlled  Cholesterol 179, triglyceride 58, HDL 62,   Advised to continue simvastatin and low-cholesterol diet

## 2021-09-22 NOTE — ASSESSMENT & PLAN NOTE
He went to emergency room August 26, 2021 with chest pain like a chest heaviness  Had a workup including troponin and EKG was unremarkable  Patient's father had coronary artery disease in his 46s  Patient is worrying about heart disease so will refer to cardiologist for further evaluation  asymptomatic since he left emergency room  Except occasionally gets reflux most likely GI related  No

## 2021-09-22 NOTE — ASSESSMENT & PLAN NOTE
Patient has scattered lumps  on both upper extremities  He had an evaluation by General surgery in the past had biopsy has most likely lipoma  Patient feels lately lumps are increasing in size and would like to follow-up with general surgery so will schedule

## 2021-09-22 NOTE — ASSESSMENT & PLAN NOTE
Patient used  to take iron therapy but he stopped taking iron  supplement  Hemoglobin normal   Will follow ferritin level and CBC

## 2021-09-22 NOTE — ASSESSMENT & PLAN NOTE
He is having a pain in both lower legs at night  Rule out any metabolic problem versus rule out secondary to statin therapy  Will check blood test 1st   If unremarkable may need to discontinue statin therapy  Patient started taking over-the-counter magnesium supplement

## 2021-09-22 NOTE — ASSESSMENT & PLAN NOTE
Has a multiple scatter lumps on both upper extremities  Had seen General surgery in the past had a biopsy , most likely lipoma  But patient feels lately is increasing in size and would like to have follow-up with surgeon

## 2021-09-22 NOTE — ASSESSMENT & PLAN NOTE
Sometime he gets GE reflux  Although he does not take any medication  Patient stated he will watch diet very closely and would like to avoid medication if possible    Patient made an appointment to see gastroenterologist

## 2021-09-23 ENCOUNTER — OFFICE VISIT (OUTPATIENT)
Dept: SURGERY | Facility: CLINIC | Age: 41
End: 2021-09-23
Payer: COMMERCIAL

## 2021-09-23 VITALS
WEIGHT: 139 LBS | BODY MASS INDEX: 21.82 KG/M2 | HEIGHT: 67 IN | SYSTOLIC BLOOD PRESSURE: 122 MMHG | HEART RATE: 61 BPM | DIASTOLIC BLOOD PRESSURE: 82 MMHG

## 2021-09-23 DIAGNOSIS — E88.2 DERCUM'S DISEASE: Primary | ICD-10-CM

## 2021-09-23 DIAGNOSIS — R22.9 LUMPS ON THE SKIN: ICD-10-CM

## 2021-09-23 PROCEDURE — 99242 OFF/OP CONSLTJ NEW/EST SF 20: CPT | Performed by: SURGERY

## 2021-09-23 PROCEDURE — 1036F TOBACCO NON-USER: CPT | Performed by: SURGERY

## 2021-09-23 NOTE — PROGRESS NOTES
Assessment/Plan:   Diagnoses and all orders for this visit:    Dercum's disease    Lumps on the skin  -     Ambulatory referral to General Surgery        Patient for excision of mass left anterior chest wall in the office  Subjective:      Patient ID: Makayla Alonso is a 42 y o  male  HPI     Patient has known history of multiple lipomas  He has had these excised in my office 15 years ago  Patient now has a mass on the left anterior chest wall which he wants excised      The following portions of the patient's history were reviewed and updated as appropriate: allergies, current medications, past family history, past medical history, past social history, past surgical history, and problem list     Review of Systems      No significant systemic disease    Objective:    /82   Pulse 61   Ht 5' 7" (1 702 m)   Wt 63 kg (139 lb)   BMI 21 77 kg/m²      Physical Exam     1 cm subcutaneous mass on the left anterior chest wall

## 2021-09-28 ENCOUNTER — PROCEDURE VISIT (OUTPATIENT)
Dept: SURGERY | Facility: CLINIC | Age: 41
End: 2021-09-28
Payer: COMMERCIAL

## 2021-09-28 ENCOUNTER — APPOINTMENT (OUTPATIENT)
Dept: LAB | Facility: HOSPITAL | Age: 41
End: 2021-09-28
Payer: COMMERCIAL

## 2021-09-28 VITALS
BODY MASS INDEX: 21.82 KG/M2 | SYSTOLIC BLOOD PRESSURE: 118 MMHG | DIASTOLIC BLOOD PRESSURE: 82 MMHG | HEART RATE: 84 BPM | WEIGHT: 139 LBS | HEIGHT: 67 IN

## 2021-09-28 DIAGNOSIS — R22.2 MASS OF CHEST WALL, LEFT: Primary | ICD-10-CM

## 2021-09-28 PROBLEM — M79.661 PAIN IN BOTH LOWER LEGS: Status: RESOLVED | Noted: 2021-09-21 | Resolved: 2021-09-28

## 2021-09-28 PROBLEM — E61.1 IRON DEFICIENCY: Status: RESOLVED | Noted: 2021-02-28 | Resolved: 2021-09-28

## 2021-09-28 PROBLEM — R22.9 LUMPS ON THE SKIN: Status: RESOLVED | Noted: 2021-09-21 | Resolved: 2021-09-28

## 2021-09-28 PROBLEM — M79.662 PAIN IN BOTH LOWER LEGS: Status: RESOLVED | Noted: 2021-09-21 | Resolved: 2021-09-28

## 2021-09-28 PROBLEM — R07.9 CHEST PAIN: Status: RESOLVED | Noted: 2021-09-21 | Resolved: 2021-09-28

## 2021-09-28 PROBLEM — D72.819 LEUKOPENIA: Status: RESOLVED | Noted: 2021-02-28 | Resolved: 2021-09-28

## 2021-09-28 PROBLEM — E78.00 HYPERCHOLESTEROLEMIA: Status: RESOLVED | Noted: 2021-02-28 | Resolved: 2021-09-28

## 2021-09-28 LAB
BASOPHILS # BLD AUTO: 29 CELLS/UL (ref 0–200)
BASOPHILS NFR BLD AUTO: 0.8 %
CHOLEST SERPL-MCNC: 195 MG/DL
CHOLEST/HDLC SERPL: 2.7 (CALC)
CK SERPL-CCNC: 169 U/L (ref 44–196)
EOSINOPHIL # BLD AUTO: 72 CELLS/UL (ref 15–500)
EOSINOPHIL NFR BLD AUTO: 2 %
ERYTHROCYTE [DISTWIDTH] IN BLOOD BY AUTOMATED COUNT: 13.1 % (ref 11–15)
FERRITIN SERPL-MCNC: 8 NG/ML (ref 38–380)
HCT VFR BLD AUTO: 43.7 % (ref 38.5–50)
HCV AB S/CO SERPL IA: <0.02
HCV AB SERPL QL IA: NORMAL
HDLC SERPL-MCNC: 71 MG/DL
HGB BLD-MCNC: 14.5 G/DL (ref 13.2–17.1)
LDLC SERPL CALC-MCNC: 107 MG/DL (CALC)
LYMPHOCYTES # BLD AUTO: 1512 CELLS/UL (ref 850–3900)
LYMPHOCYTES NFR BLD AUTO: 42 %
MAGNESIUM SERPL-MCNC: 2.2 MG/DL (ref 1.5–2.5)
MCH RBC QN AUTO: 28.1 PG (ref 27–33)
MCHC RBC AUTO-ENTMCNC: 33.2 G/DL (ref 32–36)
MCV RBC AUTO: 84.7 FL (ref 80–100)
MONOCYTES # BLD AUTO: 392 CELLS/UL (ref 200–950)
MONOCYTES NFR BLD AUTO: 10.9 %
NEUTROPHILS # BLD AUTO: 1595 CELLS/UL (ref 1500–7800)
NEUTROPHILS NFR BLD AUTO: 44.3 %
NONHDLC SERPL-MCNC: 124 MG/DL (CALC)
PLATELET # BLD AUTO: 204 THOUSAND/UL (ref 140–400)
PMV BLD REES-ECKER: 9.8 FL (ref 7.5–12.5)
RBC # BLD AUTO: 5.16 MILLION/UL (ref 4.2–5.8)
TRIGL SERPL-MCNC: 83 MG/DL
TSH SERPL-ACNC: 1.65 MIU/L (ref 0.4–4.5)
WBC # BLD AUTO: 3.6 THOUSAND/UL (ref 3.8–10.8)

## 2021-09-28 PROCEDURE — 3008F BODY MASS INDEX DOCD: CPT | Performed by: SURGERY

## 2021-09-28 PROCEDURE — 88304 TISSUE EXAM BY PATHOLOGIST: CPT | Performed by: PATHOLOGY

## 2021-09-28 PROCEDURE — 21550 BIOPSY OF NECK/CHEST: CPT | Performed by: SURGERY

## 2021-09-28 NOTE — PROGRESS NOTES
Excision biopsy of mass left anterior chest wall    Date/Time: 9/28/2021 3:43 PM  Performed by: Marin Bryant MD  Authorized by: Marin Bryant MD   Universal Protocol:  Consent: Written consent obtained  Risks and benefits: risks, benefits and alternatives were discussed  Consent given by: patient  Time out: Immediately prior to procedure a "time out" was called to verify the correct patient, procedure, equipment, support staff and site/side marked as required  Timeout called at: 9/28/2021 3:43 PM   Patient understanding: patient states understanding of the procedure being performed  Patient consent: the patient's understanding of the procedure matches consent given  Procedure consent: procedure consent matches procedure scheduled  Site marked: the operative site was marked    Procedure Details - Skin Biopsy:     Biopsy tissue type: superficial soft tissue    Initial size (mm):  20    Malignancy: benign lesion       Local anesthesia used is 10 mils of 1% lidocaine with 1 in 200,000 epinephrine with sodium bicarbonate     Lipomatous mass excised and sent for pathological examination  Subcutaneous and subcuticular sutures closed with 4-0 Vicryl      Band-Aid dressing applied     patient tolerated procedure well

## 2021-10-19 DIAGNOSIS — E78.00 PURE HYPERCHOLESTEROLEMIA, UNSPECIFIED: ICD-10-CM

## 2021-10-19 RX ORDER — SIMVASTATIN 10 MG
TABLET ORAL
Qty: 90 TABLET | Refills: 1 | Status: SHIPPED | OUTPATIENT
Start: 2021-10-19 | End: 2022-05-10 | Stop reason: SDUPTHER

## 2021-11-11 ENCOUNTER — OFFICE VISIT (OUTPATIENT)
Dept: CARDIOLOGY CLINIC | Facility: CLINIC | Age: 41
End: 2021-11-11
Payer: COMMERCIAL

## 2021-11-11 VITALS
OXYGEN SATURATION: 99 % | SYSTOLIC BLOOD PRESSURE: 125 MMHG | DIASTOLIC BLOOD PRESSURE: 80 MMHG | HEART RATE: 66 BPM | WEIGHT: 139 LBS | BODY MASS INDEX: 21.82 KG/M2 | HEIGHT: 67 IN

## 2021-11-11 DIAGNOSIS — Z82.49 FAMILY HISTORY OF CORONARY ARTERY DISEASE: ICD-10-CM

## 2021-11-11 DIAGNOSIS — E78.5 DYSLIPIDEMIA: Primary | ICD-10-CM

## 2021-11-11 DIAGNOSIS — R07.9 CHEST PAIN, UNSPECIFIED TYPE: ICD-10-CM

## 2021-11-11 PROCEDURE — 99204 OFFICE O/P NEW MOD 45 MIN: CPT | Performed by: INTERNAL MEDICINE

## 2021-11-11 PROCEDURE — 93000 ELECTROCARDIOGRAM COMPLETE: CPT | Performed by: INTERNAL MEDICINE

## 2021-11-11 RX ORDER — FERROUS SULFATE 325(65) MG
325 TABLET ORAL
COMMUNITY

## 2021-11-19 ENCOUNTER — OFFICE VISIT (OUTPATIENT)
Dept: GASTROENTEROLOGY | Facility: CLINIC | Age: 41
End: 2021-11-19
Payer: COMMERCIAL

## 2021-11-19 VITALS
DIASTOLIC BLOOD PRESSURE: 78 MMHG | WEIGHT: 140 LBS | BODY MASS INDEX: 21.97 KG/M2 | SYSTOLIC BLOOD PRESSURE: 147 MMHG | HEIGHT: 67 IN | HEART RATE: 66 BPM

## 2021-11-19 DIAGNOSIS — K21.9 GASTROESOPHAGEAL REFLUX DISEASE WITHOUT ESOPHAGITIS: Primary | ICD-10-CM

## 2021-11-19 DIAGNOSIS — R79.0 LOW FERRITIN LEVEL: ICD-10-CM

## 2021-11-19 PROCEDURE — 1036F TOBACCO NON-USER: CPT | Performed by: INTERNAL MEDICINE

## 2021-11-19 PROCEDURE — 3008F BODY MASS INDEX DOCD: CPT | Performed by: INTERNAL MEDICINE

## 2021-11-19 PROCEDURE — 99214 OFFICE O/P EST MOD 30 MIN: CPT | Performed by: INTERNAL MEDICINE

## 2021-12-02 ENCOUNTER — APPOINTMENT (OUTPATIENT)
Dept: LAB | Facility: CLINIC | Age: 41
End: 2021-12-02
Payer: COMMERCIAL

## 2021-12-02 DIAGNOSIS — R79.0 LOW FERRITIN LEVEL: ICD-10-CM

## 2021-12-02 PROCEDURE — 83516 IMMUNOASSAY NONANTIBODY: CPT

## 2021-12-02 PROCEDURE — 86255 FLUORESCENT ANTIBODY SCREEN: CPT

## 2021-12-02 PROCEDURE — 82784 ASSAY IGA/IGD/IGG/IGM EACH: CPT

## 2021-12-02 PROCEDURE — 36415 COLL VENOUS BLD VENIPUNCTURE: CPT

## 2021-12-03 LAB
ENDOMYSIUM IGA SER QL: NEGATIVE
GLIADIN PEPTIDE IGA SER-ACNC: 3 UNITS (ref 0–19)
GLIADIN PEPTIDE IGG SER-ACNC: 2 UNITS (ref 0–19)
IGA SERPL-MCNC: 107 MG/DL (ref 90–386)
TTG IGA SER-ACNC: <2 U/ML (ref 0–3)
TTG IGG SER-ACNC: <2 U/ML (ref 0–5)

## 2022-02-08 ENCOUNTER — OFFICE VISIT (OUTPATIENT)
Dept: INTERNAL MEDICINE CLINIC | Facility: CLINIC | Age: 42
End: 2022-02-08
Payer: COMMERCIAL

## 2022-02-08 VITALS
SYSTOLIC BLOOD PRESSURE: 122 MMHG | OXYGEN SATURATION: 98 % | HEART RATE: 62 BPM | TEMPERATURE: 98.9 F | BODY MASS INDEX: 21.77 KG/M2 | DIASTOLIC BLOOD PRESSURE: 78 MMHG | WEIGHT: 139 LBS

## 2022-02-08 DIAGNOSIS — E78.00 HYPERCHOLESTEROLEMIA: Primary | ICD-10-CM

## 2022-02-08 DIAGNOSIS — D72.819 LEUKOPENIA, UNSPECIFIED TYPE: ICD-10-CM

## 2022-02-08 DIAGNOSIS — K21.9 GASTROESOPHAGEAL REFLUX DISEASE WITHOUT ESOPHAGITIS: ICD-10-CM

## 2022-02-08 DIAGNOSIS — E61.1 IRON DEFICIENCY: ICD-10-CM

## 2022-02-08 DIAGNOSIS — Z79.899 HIGH RISK MEDICATION USE: ICD-10-CM

## 2022-02-08 DIAGNOSIS — R25.2 MUSCLE CRAMPS: ICD-10-CM

## 2022-02-08 PROCEDURE — 99214 OFFICE O/P EST MOD 30 MIN: CPT | Performed by: INTERNAL MEDICINE

## 2022-02-08 PROCEDURE — 1036F TOBACCO NON-USER: CPT | Performed by: INTERNAL MEDICINE

## 2022-02-08 NOTE — PATIENT INSTRUCTIONS
Patient was advised to continue present medications  discussed with the patient medications and laboratory data in detail  Follow-up with me in 6 months or as advised  If any blood test was ordered please do 1 week prior to next appointment unless advise to get earlier    If you have any questions please call the office [phone]

## 2022-02-08 NOTE — PROGRESS NOTES
Assessment/Plan:    1  Hypercholesterolemia  Assessment & Plan:  Cholesterol 195, triglyceride 71, HDL 83,   Advised to continue present medication and low-cholesterol low saturated diet  Orders:  -     Lipid panel; Future  -     Comprehensive metabolic panel; Future    2  Gastroesophageal reflux disease without esophagitis  Assessment & Plan:  Has having  reflux symptoms but he is  trying to watch diet and trying to avoid medication  Seen by GI specialist will be going for EGD  3  Leukopenia, unspecified type  Assessment & Plan:  Has chronic leukopenia  Had seen by hematologist   Fairly stable  Will follow CBC if get worse will refer to hematologist again    Orders:  -     CBC and differential; Future  -     Lipid panel; Future  -     Comprehensive metabolic panel; Future    4  Iron deficiency  Assessment & Plan:  He has been taking ferrous sulfate 4 times per week  Will follow CBC ferritin level  Going for EGD and colonoscopy  Orders:  -     CBC and differential; Future  -     Ferritin; Future    5  BMI 21 0-21 9, adult    6  High risk medication use  -     Comprehensive metabolic panel; Future    7  Muscle cramps  Assessment & Plan:  He gets muscle cramps worse at nighttime  He tried magnesium but he was getting loose stools so he stopped taking magnesium  Advised to take 1 multivitamin daily  Advised to maintain hydration  Will follow metabolic workup  Orders:  -     Comprehensive metabolic panel; Future  -     Vitamin D 25 hydroxy; Future          Subjective:  Patient presents for follow-up  Patient ID: Elaine Anderson is a 42 y o  male  HPI   25-year-old male patient presents for follow-up of his medical problems  He denies any chest pain, shortness of breath, pain in abdomen  Denies any cough, fever, chills  He was seen by gastroenterologist   Will be going for EGD and colonoscopy  He got his COVID-19 vaccination including booster dose    He had a chest wall mass secondary to lipoma which was removed by surgery  The following portions of the patient's history were reviewed and updated as appropriate:     Past Medical History:  He has a past medical history of BMI 21 0-21 9, adult (3/2/2021), BMI 21 0-21 9, adult (2/8/2022), Chest pain (9/21/2021), Chest pain, atypical, Esophageal reflux, GE reflux (2/28/2021), Hypercholesterolemia (2/28/2021), Hypercholesterolemia (2/8/2022), Iron deficiency (2/28/2021), Iron deficiency (2/8/2022), Leukopenia (2/28/2021), Leukopenia (2/8/2022), Lump of skin (3/2/2021), Lumps on the skin (9/21/2021), Muscle cramps (2/8/2022), Nocturia, Pain in both lower legs (9/21/2021), and Urinary urgency  ,  _______________________________________________________________________  Past Surgical History:   has a past surgical history that includes No past surgeries  ,  _______________________________________________________________________  Family History:  family history includes Coronary artery disease in his father; Stroke in his father ,  _______________________________________________________________________  Social History:   reports that he has never smoked  He has never used smokeless tobacco  He reports that he does not drink alcohol and does not use drugs  ,  _______________________________________________________________________  Allergies:  has No Known Allergies     _______________________________________________________________________  Current Outpatient Medications   Medication Sig Dispense Refill    ferrous sulfate 325 (65 Fe) mg tablet Take 325 mg by mouth 4 (four) times a week      simvastatin (ZOCOR) 10 mg tablet TAKE 1 TABLET BY MOUTH EVERY DAY 90 tablet 1     No current facility-administered medications for this visit      _______________________________________________________________________  Review of Systems   Constitutional: Negative for chills, fatigue and fever     HENT: Negative for congestion, ear pain, hearing loss, nosebleeds, sinus pain, sore throat and trouble swallowing  Eyes: Negative for discharge, redness and visual disturbance  Respiratory: Negative for cough, chest tightness and shortness of breath  Cardiovascular: Negative for chest pain and palpitations  Gastrointestinal: Negative for abdominal pain, blood in stool, constipation, diarrhea, nausea and vomiting  Genitourinary: Negative for dysuria, flank pain, frequency and hematuria  Musculoskeletal: Positive for myalgias (Like muscle cramps of lower legs mainly at nighttime  )  Negative for arthralgias and neck pain  Skin: Negative for color change and rash  Neurological: Negative for dizziness, speech difficulty, weakness and headaches  Hematological: Does not bruise/bleed easily  Psychiatric/Behavioral: Negative for agitation and behavioral problems  Objective:  Vitals:    02/08/22 1409   BP: 122/78   BP Location: Right arm   Patient Position: Sitting   Cuff Size: Adult   Pulse: 62   Temp: 98 9 °F (37 2 °C)   TempSrc: Tympanic   SpO2: 98%   Weight: 63 kg (139 lb)     Body mass index is 21 77 kg/m²  Physical Exam  Vitals and nursing note reviewed  Constitutional:       General: He is not in acute distress  Appearance: Normal appearance  He is normal weight  HENT:      Head: Normocephalic and atraumatic  Right Ear: Ear canal and external ear normal       Left Ear: Ear canal and external ear normal       Nose:      Comments: Patient has a face mask on     Mouth/Throat:      Comments: Patient has a face mask on  Eyes:      General: No scleral icterus  Right eye: No discharge  Left eye: No discharge  Extraocular Movements: Extraocular movements intact  Conjunctiva/sclera: Conjunctivae normal    Cardiovascular:      Rate and Rhythm: Normal rate and regular rhythm  Pulses: Normal pulses  Heart sounds: No murmur heard  Pulmonary:      Effort: Pulmonary effort is normal  No respiratory distress  Breath sounds: Normal breath sounds  Abdominal:      General: Bowel sounds are normal       Palpations: Abdomen is soft  Tenderness: There is no abdominal tenderness  Musculoskeletal:         General: Normal range of motion  Cervical back: Normal range of motion and neck supple  Right lower leg: No edema  Left lower leg: No edema  Skin:     General: Skin is warm  Findings: No rash  Neurological:      General: No focal deficit present  Mental Status: He is alert and oriented to person, place, and time  Motor: No weakness  Coordination: Coordination normal    Psychiatric:         Mood and Affect: Mood normal          Behavior: Behavior normal          I spent 30 minutes with the patient today    More than 50% time spent for reviewing of external notes, reviewing of the results of diagnostics test, management of care, patient education and ordering of test

## 2022-02-09 DIAGNOSIS — B00.1 RECURRENT COLD SORES: Primary | ICD-10-CM

## 2022-02-09 RX ORDER — FAMCICLOVIR 500 MG/1
500 TABLET, FILM COATED ORAL 3 TIMES DAILY
Qty: 21 TABLET | Refills: 0 | Status: SHIPPED | OUTPATIENT
Start: 2022-02-09 | End: 2022-08-09 | Stop reason: ALTCHOICE

## 2022-02-09 NOTE — ASSESSMENT & PLAN NOTE
Cholesterol 195, triglyceride 71, HDL 83,   Advised to continue present medication and low-cholesterol low saturated diet

## 2022-02-09 NOTE — ASSESSMENT & PLAN NOTE
Has having  reflux symptoms but he is  trying to watch diet and trying to avoid medication  Seen by GI specialist will be going for EGD

## 2022-02-09 NOTE — ASSESSMENT & PLAN NOTE
He has been taking ferrous sulfate 4 times per week  Will follow CBC ferritin level  Going for EGD and colonoscopy

## 2022-02-09 NOTE — ASSESSMENT & PLAN NOTE
Has chronic leukopenia  Had seen by hematologist   Fairly stable    Will follow CBC if get worse will refer to hematologist again

## 2022-02-09 NOTE — ASSESSMENT & PLAN NOTE
He gets muscle cramps worse at nighttime  He tried magnesium but he was getting loose stools so he stopped taking magnesium  Advised to take 1 multivitamin daily  Advised to maintain hydration  Will follow metabolic workup

## 2022-02-10 ENCOUNTER — HOSPITAL ENCOUNTER (OUTPATIENT)
Dept: NON INVASIVE DIAGNOSTICS | Facility: CLINIC | Age: 42
Discharge: HOME | End: 2022-02-10
Payer: COMMERCIAL

## 2022-02-10 DIAGNOSIS — R07.9 CHEST PAIN, UNSPECIFIED TYPE: ICD-10-CM

## 2022-02-10 LAB
BASELINE ST DEPRESSION: 0 MM
MAX HR PERCENT: 96 %
RATE PRESSURE PRODUCT: NORMAL
SL CV STRESS RECOVERY BP: NORMAL MMHG
SL CV STRESS RECOVERY HR: 99 BPM
SL CV STRESS RECOVERY O2 SAT: 97 %
STRESS ANGINA INDEX: 0
STRESS BASELINE BP: NORMAL MMHG
STRESS BASELINE HR: 69 BPM
STRESS DUKE TREADMILL SCORE: 11
STRESS O2 SAT REST: 100 %
STRESS PEAK HR: 171 BPM
STRESS PERCENT HR: 96 %
STRESS POST ESTIMATED WORKLOAD: 13.5 METS
STRESS POST EXERCISE DUR MIN: 11 MIN
STRESS POST O2 SAT PEAK: 98 %
STRESS POST PEAK BP: 160 MMHG
STRESS ST DEPRESSION: 0 MM
STRESS TARGET HR: 171 BPM

## 2022-02-10 PROCEDURE — 93017 CV STRESS TEST TRACING ONLY: CPT

## 2022-02-10 PROCEDURE — 93018 CV STRESS TEST I&R ONLY: CPT | Performed by: INTERNAL MEDICINE

## 2022-02-10 PROCEDURE — 93016 CV STRESS TEST SUPVJ ONLY: CPT | Performed by: INTERNAL MEDICINE

## 2022-02-10 PROCEDURE — 78452 HT MUSCLE IMAGE SPECT MULT: CPT | Performed by: INTERNAL MEDICINE

## 2022-02-11 LAB
CHEST PAIN STATEMENT: NORMAL
MAX DIASTOLIC BP: 82 MMHG
MAX HEART RATE: 171 BPM
MAX PREDICTED HEART RATE: 178 BPM
MAX. SYSTOLIC BP: 170 MMHG
PROTOCOL NAME: NORMAL
REASON FOR TERMINATION: NORMAL
TARGET HR FORMULA: NORMAL
TEST INDICATION: NORMAL
TIME IN EXERCISE PHASE: NORMAL

## 2022-03-10 ENCOUNTER — ANESTHESIA EVENT (OUTPATIENT)
Dept: GASTROENTEROLOGY | Facility: AMBULATORY SURGERY CENTER | Age: 42
End: 2022-03-10

## 2022-03-10 ENCOUNTER — HOSPITAL ENCOUNTER (OUTPATIENT)
Dept: GASTROENTEROLOGY | Facility: AMBULATORY SURGERY CENTER | Age: 42
Discharge: HOME | End: 2022-03-10
Payer: COMMERCIAL

## 2022-03-10 ENCOUNTER — ANESTHESIA (OUTPATIENT)
Dept: GASTROENTEROLOGY | Facility: AMBULATORY SURGERY CENTER | Age: 42
End: 2022-03-10
Payer: COMMERCIAL

## 2022-03-10 DIAGNOSIS — K21.9 GASTROESOPHAGEAL REFLUX DISEASE WITHOUT ESOPHAGITIS: ICD-10-CM

## 2022-03-10 DIAGNOSIS — R79.0 LOW FERRITIN LEVEL: ICD-10-CM

## 2022-03-10 PROCEDURE — 43239 EGD BIOPSY SINGLE/MULTIPLE: CPT | Performed by: INTERNAL MEDICINE

## 2022-03-10 PROCEDURE — 88313 SPECIAL STAINS GROUP 2: CPT | Performed by: PATHOLOGY

## 2022-03-10 PROCEDURE — 45380 COLONOSCOPY AND BIOPSY: CPT | Performed by: INTERNAL MEDICINE

## 2022-03-10 PROCEDURE — 88342 IMHCHEM/IMCYTCHM 1ST ANTB: CPT | Performed by: PATHOLOGY

## 2022-03-10 PROCEDURE — 88305 TISSUE EXAM BY PATHOLOGIST: CPT | Performed by: PATHOLOGY

## 2022-03-10 PROCEDURE — 00813 ANES UPR LWR GI NDSC PX: CPT | Performed by: NURSE ANESTHETIST, CERTIFIED REGISTERED

## 2022-03-10 RX ORDER — FAMOTIDINE 40 MG/1
40 TABLET, FILM COATED ORAL DAILY
Qty: 30 TABLET | Refills: 1 | Status: SHIPPED | OUTPATIENT
Start: 2022-03-10 | End: 2022-03-10 | Stop reason: SDUPTHER

## 2022-03-10 RX ORDER — PROPOFOL 10 MG/ML
INJECTION, EMULSION INTRAVENOUS AS NEEDED
Status: DISCONTINUED | OUTPATIENT
Start: 2022-03-10 | End: 2022-03-10

## 2022-03-10 RX ORDER — FAMOTIDINE 40 MG/1
40 TABLET, FILM COATED ORAL
Qty: 30 TABLET | Refills: 1 | Status: SHIPPED | OUTPATIENT
Start: 2022-03-10 | End: 2022-04-09

## 2022-03-10 RX ORDER — SODIUM CHLORIDE 9 MG/ML
INJECTION, SOLUTION INTRAVENOUS CONTINUOUS PRN
Status: DISCONTINUED | OUTPATIENT
Start: 2022-03-10 | End: 2022-03-10

## 2022-03-10 RX ORDER — SODIUM CHLORIDE 9 MG/ML
30 INJECTION, SOLUTION INTRAVENOUS CONTINUOUS
Status: DISCONTINUED | OUTPATIENT
Start: 2022-03-10 | End: 2022-03-14 | Stop reason: HOSPADM

## 2022-03-10 RX ORDER — LIDOCAINE HYDROCHLORIDE 10 MG/ML
INJECTION, SOLUTION EPIDURAL; INFILTRATION; INTRACAUDAL; PERINEURAL AS NEEDED
Status: DISCONTINUED | OUTPATIENT
Start: 2022-03-10 | End: 2022-03-10

## 2022-03-10 RX ADMIN — PROPOFOL 50 MG: 10 INJECTION, EMULSION INTRAVENOUS at 08:11

## 2022-03-10 RX ADMIN — PROPOFOL 50 MG: 10 INJECTION, EMULSION INTRAVENOUS at 08:20

## 2022-03-10 RX ADMIN — SODIUM CHLORIDE: 9 INJECTION, SOLUTION INTRAVENOUS at 07:55

## 2022-03-10 RX ADMIN — LIDOCAINE HYDROCHLORIDE 50 MG: 10 INJECTION, SOLUTION EPIDURAL; INFILTRATION; INTRACAUDAL; PERINEURAL at 07:59

## 2022-03-10 RX ADMIN — PROPOFOL 150 MG: 10 INJECTION, EMULSION INTRAVENOUS at 07:59

## 2022-03-10 RX ADMIN — PROPOFOL 50 MG: 10 INJECTION, EMULSION INTRAVENOUS at 08:07

## 2022-03-10 RX ADMIN — PROPOFOL 50 MG: 10 INJECTION, EMULSION INTRAVENOUS at 08:04

## 2022-03-10 NOTE — H&P
History and Physical -  [facility]  Natividad Godfrey 42 y o  male MRN: [id number]                  HPI: Natividad Godfrey is a 42y o  year old male who presents for EGD and colonoscopy for iron deficiency, low ferritin, history of GERD and family history of colorectal cancer      REVIEW OF SYSTEMS: Per the HPI, and otherwise unremarkable      Historical Information   Past Medical History:   Diagnosis Date    BMI 21 0-21 9, adult 3/2/2021    BMI 21 0-21 9, adult 2/8/2022    Chest pain 09/21/2021    Chest pain, atypical     Esophageal reflux     GE reflux 02/28/2021    GERD (gastroesophageal reflux disease)     Hypercholesterolemia 2/28/2021    Hypercholesterolemia 2/8/2022    Iron deficiency 2/28/2021    Iron deficiency 2/8/2022    Leukopenia 2/28/2021    Leukopenia 2/8/2022    Lump of skin 3/2/2021    Lumps on the skin 9/21/2021    Muscle cramps 2/8/2022    Nocturia     Pain in both lower legs 9/21/2021    Urinary urgency      Past Surgical History:   Procedure Laterality Date    COLONOSCOPY      NO PAST SURGERIES      UPPER GASTROINTESTINAL ENDOSCOPY       Social History   Social History     Substance and Sexual Activity   Alcohol Use No     Social History     Substance and Sexual Activity   Drug Use No     Social History     Tobacco Use   Smoking Status Never Smoker   Smokeless Tobacco Never Used     Family History   Problem Relation Age of Onset    Stroke Father     Coronary artery disease Father     Colon cancer Maternal Grandfather     Colon cancer Maternal Uncle        Meds/Allergies     (Not in a hospital admission)      No Known Allergies    Objective     /87   Pulse 82   Temp (!) 97 3 °F (36 3 °C) (Temporal)   Resp 18   Ht 5' 7 5" (1 715 m)   Wt 63 5 kg (140 lb)   SpO2 100%   BMI 21 60 kg/m²       PHYSICAL EXAM    Gen: NAD  CV: RRR  CHEST: Clear  ABD: soft, NT/ND  EXT: no edema      ASSESSMENT/PLAN:  This is a 42y o  year old male here for EGD and colonoscopy, and he is stable and optimized for his procedure

## 2022-03-10 NOTE — ANESTHESIA POSTPROCEDURE EVALUATION
Post-Op Assessment Note    CV Status:  Stable  Pain Score: 1    Pain management: adequate     Mental Status:  Alert   Hydration Status:  Stable   Airway Patency:  Patent      Post Op Vitals Reviewed: Yes      Staff: CRNA         No complications documented      BP   116/80   Temp     Pulse  78   Resp   16   SpO2   99

## 2022-03-10 NOTE — DISCHARGE INSTRUCTIONS
Upper Endoscopy and Colonoscopy   WHAT YOU NEED TO KNOW:   An upper endoscopy is also called an upper gastrointestinal (GI) endoscopy, or an esophagogastroduodenoscopy (EGD)  It is a procedure to examine the inside of your esophagus, stomach, and duodenum (first part of the small intestine) with a scope  You may feel bloated, gassy, or have some abdominal discomfort after your procedure  Your throat may be sore for 24 to 36 hours  You may burp or pass gas from air that is still inside your body  A colonoscopy is a procedure to examine the inside of your colon (intestine) with a scope  Polyps or tissue growths may have been removed during your colonoscopy  It is normal to feel bloated and to have some abdominal discomfort  You should be passing gas  If you have hemorrhoids or you had polyps removed, you may have a small amount of bleeding  DISCHARGE INSTRUCTIONS:   Seek care immediately if:   · You have sudden, severe abdominal pain  · You have problems swallowing  · You have a large amount of black, sticky bowel movements or blood in your bowel movements  · You have sudden trouble breathing  · You feel weak, lightheaded, or faint or your heart beats faster than normal for you  Contact your healthcare provider if:   · You have a fever and chills  · You have nausea or are vomiting  · Your abdomen is bloated or feels full and hard  · You have abdominal pain  · You have a large amount of black, sticky bowel movements or blood in your bowel movements  · You have not had a bowel movement for 3 days after your procedure  · You have rash or hives  · You have questions or concerns about your procedure  Activity:   ·       Do not lift, strain, or run for 24 hours after your procedure  ·       Rest after your procedure  You have been given medicine to relax you  Do not drive or make important decisions until the day after your procedure   Return to your normal activity as directed  ·       Relieve gas and discomfort from bloating by lying on your right side with a heating pad on your abdomen  You may need to take short walks to help the gas move out  Eat small meals until bloating is relieved  Follow up with your healthcare provider as directed: Write down your questions so you remember to ask them during your visits  If you take a blood thinner, please review the specific instructions from your endoscopist about when you should resume it  These can be found in the Recommendation and Your Medication list sections of this After Visit Summary  High Fiber Diet   WHAT YOU NEED TO KNOW:   What is a high-fiber diet? A high-fiber diet includes foods that have a high amount of fiber  Fiber is the part of fruits, vegetables, and grains that is not broken down by your body  Fiber keeps your bowel movements regular  Fiber can also help lower your cholesterol level, control blood sugar in people with diabetes, and relieve constipation  Fiber can also help you control your weight because it helps you feel full faster  Most adults should eat 25 to 35 grams of fiber each day  Talk to your dietitian or healthcare provider about the amount of fiber you need  What foods are good sources of fiber? · Foods with at least 4 grams of fiber per serving:      ? ? to ½ cup of high-fiber cereal (check the nutrition label on the box)    ? ½ cup of blackberries or raspberries    ? 4 dried prunes    ? 1 cooked artichoke    ? ½ cup of cooked legumes, such as lentils, or red, kidney, and alarcon beans    · Foods with 1 to 3 grams of fiber per serving:      ? 1 slice of whole-wheat, pumpernickel, or rye bread    ? ½ cup of cooked brown rice    ? 4 whole-wheat crackers    ? 1 cup of oatmeal    ? ½ cup of cereal with 1 to 3 grams of fiber per serving (check the nutrition label on the box)    ? 1 small piece of fruit, such as an apple, banana, pear, kiwi, or orange    ?  3 dates    ? ½ cup of canned apricots, fruit cocktail, peaches, or pears    ? ½ cup of raw or cooked vegetables, such as carrots, cauliflower, cabbage, spinach, squash, or corn    What are some ways that I can increase fiber in my diet? · Choose brown or wild rice instead of white rice  · Use whole wheat flour in recipes instead of white or all-purpose flour  · Add beans and peas to casseroles or soups  · Choose fresh fruit and vegetables with peels or skins on instead of juices  What other guidelines should I follow? · Add fiber to your diet slowly  You may have abdominal discomfort, bloating, and gas if you add fiber to your diet too quickly  · Drink plenty of liquids as you add fiber to your diet  You may have nausea or develop constipation if you do not drink enough water  Ask how much liquid to drink each day and which liquids are best for you  CARE AGREEMENT:   You have the right to help plan your care  Discuss treatment options with your healthcare provider to decide what care you want to receive  You always have the right to refuse treatment  The above information is an  only  It is not intended as medical advice for individual conditions or treatments  Talk to your doctor, nurse or pharmacist before following any medical regimen to see if it is safe and effective for you  © Copyright [employer] 2022 Information is for End User's use only and may not be sold, redistributed or otherwise used for commercial purposes  All illustrations and images included in CareNotes® are the copyrighted property of [employer]  or [employer]  Hemorrhoids   WHAT YOU NEED TO KNOW:   What are hemorrhoids? Hemorrhoids are swollen blood vessels inside your rectum (internal hemorrhoids) or on your anus (external hemorrhoids)  Sometimes a hemorrhoid may prolapse  This means it extends out of your anus  What increases my risk for hemorrhoids?    · Pregnancy or obesity    · Straining or sitting for a long time during bowel movements    · Liver disease    · Weak muscles around the anus caused by older age, rectal surgery, or anal intercourse    · A lack of physical activity    · Chronic diarrhea or constipation    · A low-fiber diet    What are the signs and symptoms of hemorrhoids? · Pain or itching around your anus or inside your rectum    · Swelling or bumps around your anus    · Bright red blood in your bowel movement, on the toilet paper, or in the toilet bowl    · Tissue bulging out of your anus (prolapsed hemorrhoids)    · Incontinence (poor control over urine or bowel movements)    How are hemorrhoids diagnosed? Your healthcare provider will ask about your symptoms, the foods you eat, and your bowel movements  He or she will examine your anus for external hemorrhoids  You may need the following:  · A digital rectal exam  is a test to check for hemorrhoids  Your healthcare provider will put a gloved finger inside your anus to feel for the hemorrhoids  · An anoscopy  is a test that uses a scope (small tube with a light and camera on the end) to look at your hemorrhoids  How are hemorrhoids treated? Treatment will depend on your symptoms  You may need any of the following:  · Medicines  can help decrease pain and swelling, and soften your bowel movement  The medicine may be a pill, pad, cream, or ointment  · Procedures  may be used to shrink or remove your hemorrhoid  Examples include rubber-band ligation, sclerotherapy, and photocoagulation  These procedures may be done in your healthcare provider's office  Ask your healthcare provider for more information about these procedures  · Surgery  may be needed to shrink or remove your hemorrhoids  How can I manage my symptoms? · Apply ice on your anus for 15 to 20 minutes every hour or as directed  Use an ice pack, or put crushed ice in a plastic bag   Cover it with a towel before you apply it to your anus  Ice helps prevent tissue damage and decreases swelling and pain  · Take a sitz bath  Fill a bathtub with 4 to 6 inches of warm water  You may also use a sitz bath pan that fits inside a toilet bowl  Sit in the sitz bath for 15 minutes  Do this 3 times a day, and after each bowel movement  The warm water can help decrease pain and swelling  · Keep your anal area clean  Gently wash the area with warm water daily  Soap may irritate the area  After a bowel movement, wipe with moist towelettes or wet toilet paper  Dry toilet paper can irritate the area  How can I help prevent hemorrhoids? · Do not strain to have a bowel movement  Do not sit on the toilet too long  These actions can increase pressure on the tissues in your rectum and anus  · Drink plenty of liquids  Liquids can help prevent constipation  Ask how much liquid to drink each day and which liquids are best for you  · Eat a variety of high-fiber foods  Examples include fruits, vegetables, and whole grains  Ask your healthcare provider how much fiber you need each day  You may need to take a fiber supplement  · Exercise as directed  Exercise, such as walking, may make it easier to have a bowel movement  Ask your healthcare provider to help you create an exercise plan  · Do not have anal sex  Anal sex can weaken the skin around your rectum and anus  · Avoid heavy lifting  This can cause straining and increase your risk for another hemorrhoid  When should I seek immediate care? · You have severe pain in your rectum or around your anus  · You have severe pain in your abdomen and you are vomiting  · You have bleeding from your anus that soaks through your underwear  When should I contact my healthcare provider? · You have frequent and painful bowel movements  · Your hemorrhoid looks or feels more swollen than usual      · You do not have a bowel movement for 2 days or more       · You see or feel tissue coming through your anus  · You have questions or concerns about your condition or care  CARE AGREEMENT:   You have the right to help plan your care  Learn about your health condition and how it may be treated  Discuss treatment options with your healthcare providers to decide what care you want to receive  You always have the right to refuse treatment  The above information is an  only  It is not intended as medical advice for individual conditions or treatments  Talk to your doctor, nurse or pharmacist before following any medical regimen to see if it is safe and effective for you  © Copyright [employer] 2022 Information is for End User's use only and may not be sold, redistributed or otherwise used for commercial purposes  All illustrations and images included in CareNotes® are the copyrighted property of [employer]  or [employer]  Gastritis   WHAT YOU NEED TO KNOW:   Gastritis is inflammation or irritation of the lining of your stomach  DISCHARGE INSTRUCTIONS:   Call 911 for any of the following:   · You develop chest pain or shortness of breath  Seek care immediately if:   · You vomit blood  · You have black or bloody bowel movements  · You have severe stomach or back pain  Contact your healthcare provider if:   · You have a fever  · You have new or worsening symptoms, even after treatment  · You have questions or concerns about your condition or care  Medicines:   · Medicines  may be given to help treat a bacterial infection or decrease stomach acid  · Take your medicine as directed  Contact your healthcare provider if you think your medicine is not helping or if you have side effects  Tell him or her if you are allergic to any medicine  Keep a list of the medicines, vitamins, and herbs you take  Include the amounts, and when and why you take them  Bring the list or the pill bottles to follow-up visits   Carry your medicine list with you in case of an emergency  Manage or prevent gastritis:   · Do not smoke  Nicotine and other chemicals in cigarettes and cigars can make your symptoms worse and cause lung damage  Ask your healthcare provider for information if you currently smoke and need help to quit  E-cigarettes or smokeless tobacco still contain nicotine  Talk to your healthcare provider before you use these products  · Do not drink alcohol  Alcohol can prevent healing and make your gastritis worse  Talk to your healthcare provider if you need help to stop drinking  · Do not take NSAIDs or aspirin unless directed  These and similar medicines can cause irritation  If your healthcare provider says it is okay to take NSAIDs, take them with food  · Do not eat foods that cause irritation  Foods such as oranges and salsa can cause burning or pain  Eat a variety of healthy foods  Examples include fruits (not citrus), vegetables, low-fat dairy products, beans, whole-grain breads, and lean meats and fish  Try to eat small meals, and drink water with your meals  Do not eat for at least 3 hours before you go to bed  · Find ways to relax and decrease stress  Stress can increase stomach acid and make gastritis worse  Activities such as yoga, meditation, or listening to music can help you relax  Spend time with friends, or do things you enjoy  Follow up with your healthcare provider as directed: You may need ongoing tests or treatment, or referral to a gastroenterologist  Write down your questions so you remember to ask them during your visits  © Copyright [employer] 2022 Information is for End User's use only and may not be sold, redistributed or otherwise used for commercial purposes  All illustrations and images included in CareNotes® are the copyrighted property of [employer]  or [facility] [place]   The above information is an  only   It is not intended as medical advice for individual conditions or treatments  Talk to your doctor, nurse or pharmacist before following any medical regimen to see if it is safe and effective for you

## 2022-03-10 NOTE — ANESTHESIA PREPROCEDURE EVALUATION
Procedure:  COLONOSCOPY  EGD    Relevant Problems   CARDIO   (+) Hypercholesterolemia      GI/HEPATIC   (+) GE reflux        Physical Exam    Airway    Mallampati score: II  TM Distance: >3 FB  Neck ROM: full     Dental   No notable dental hx     Cardiovascular  Rhythm: regular, Rate: normal,     Pulmonary  Pulmonary exam normal     Other Findings        Anesthesia Plan  ASA Score- 2     Anesthesia Type- IV sedation with anesthesia with ASA Monitors  Additional Monitors:   Airway Plan:           Plan Factors-    Chart reviewed  Induction- intravenous  Postoperative Plan-     Informed Consent- Anesthetic plan and risks discussed with patient

## 2022-03-11 VITALS
DIASTOLIC BLOOD PRESSURE: 85 MMHG | TEMPERATURE: 97.3 F | WEIGHT: 140 LBS | RESPIRATION RATE: 18 BRPM | BODY MASS INDEX: 21.22 KG/M2 | HEIGHT: 68 IN | HEART RATE: 63 BPM | OXYGEN SATURATION: 100 % | SYSTOLIC BLOOD PRESSURE: 123 MMHG

## 2022-03-11 NOTE — PROGRESS NOTES
The post procedure documentation was reviewed by Jabier Verdin and found to be appropriate     A THERESE,RK

## 2022-03-17 ENCOUNTER — TELEPHONE (OUTPATIENT)
Dept: GASTROENTEROLOGY | Facility: CLINIC | Age: 42
End: 2022-03-17

## 2022-03-17 NOTE — TELEPHONE ENCOUNTER
Left message for patient to call and schedule f/u ov to flip with Dr Miladis Albert  Will call again in 1 wk if he doesn't return call

## 2022-03-17 NOTE — TELEPHONE ENCOUNTER
----- Message from Aba Wang LPN sent at [phone]  3:39 PM EDT -----  Patient aware via [employer]  Educated  Colon 5 years  Please call patient to schedule f/u appt   Thank you

## 2022-03-24 NOTE — TELEPHONE ENCOUNTER
Left another message for patient to call and schedule f/u ov  Will call again in 2 weeks if he doesn't return call

## 2022-03-24 NOTE — TELEPHONE ENCOUNTER
Pt returned your call about a f/u appt  He does not feel he needs an appt at this time  He will call back if he feels he needs to be seen

## 2022-05-10 ENCOUNTER — TELEPHONE (OUTPATIENT)
Dept: INTERNAL MEDICINE CLINIC | Facility: CLINIC | Age: 42
End: 2022-05-10

## 2022-05-10 DIAGNOSIS — E78.00 PURE HYPERCHOLESTEROLEMIA, UNSPECIFIED: ICD-10-CM

## 2022-05-10 RX ORDER — SIMVASTATIN 10 MG
10 TABLET ORAL DAILY
Qty: 90 TABLET | Refills: 1 | Status: SHIPPED | OUTPATIENT
Start: 2022-05-10

## 2022-08-04 LAB
25(OH)D3 SERPL-MCNC: 34 NG/ML (ref 30–100)
ALBUMIN SERPL-MCNC: 4.2 G/DL (ref 3.6–5.1)
ALBUMIN/GLOB SERPL: 1.8 (CALC) (ref 1–2.5)
ALP SERPL-CCNC: 55 U/L (ref 36–130)
ALT SERPL-CCNC: 11 U/L (ref 9–46)
AST SERPL-CCNC: 16 U/L (ref 10–40)
BASOPHILS # BLD AUTO: 19 CELLS/UL (ref 0–200)
BASOPHILS NFR BLD AUTO: 0.6 %
BILIRUB SERPL-MCNC: 0.6 MG/DL (ref 0.2–1.2)
BUN SERPL-MCNC: 16 MG/DL (ref 7–25)
BUN/CREAT SERPL: NORMAL (CALC) (ref 6–22)
CALCIUM SERPL-MCNC: 9.4 MG/DL (ref 8.6–10.3)
CHLORIDE SERPL-SCNC: 104 MMOL/L (ref 98–110)
CHOLEST SERPL-MCNC: 184 MG/DL
CHOLEST/HDLC SERPL: 2.8 (CALC)
CO2 SERPL-SCNC: 29 MMOL/L (ref 20–32)
CREAT SERPL-MCNC: 1.1 MG/DL (ref 0.6–1.29)
EOSINOPHIL # BLD AUTO: 59 CELLS/UL (ref 15–500)
EOSINOPHIL NFR BLD AUTO: 1.9 %
ERYTHROCYTE [DISTWIDTH] IN BLOOD BY AUTOMATED COUNT: 13 % (ref 11–15)
FERRITIN SERPL-MCNC: 13 NG/ML (ref 38–380)
GFR/BSA.PRED SERPLBLD CYS-BASED-ARV: 85 ML/MIN/1.73M2
GLOBULIN SER CALC-MCNC: 2.3 G/DL (CALC) (ref 1.9–3.7)
GLUCOSE SERPL-MCNC: 95 MG/DL (ref 65–99)
HCT VFR BLD AUTO: 44 % (ref 38.5–50)
HDLC SERPL-MCNC: 66 MG/DL
HGB BLD-MCNC: 14.8 G/DL (ref 13.2–17.1)
LDLC SERPL CALC-MCNC: 103 MG/DL (CALC)
LYMPHOCYTES # BLD AUTO: 1234 CELLS/UL (ref 850–3900)
LYMPHOCYTES NFR BLD AUTO: 39.8 %
MCH RBC QN AUTO: 28.6 PG (ref 27–33)
MCHC RBC AUTO-ENTMCNC: 33.6 G/DL (ref 32–36)
MCV RBC AUTO: 84.9 FL (ref 80–100)
MONOCYTES # BLD AUTO: 350 CELLS/UL (ref 200–950)
MONOCYTES NFR BLD AUTO: 11.3 %
NEUTROPHILS # BLD AUTO: 1438 CELLS/UL (ref 1500–7800)
NEUTROPHILS NFR BLD AUTO: 46.4 %
NONHDLC SERPL-MCNC: 118 MG/DL (CALC)
PLATELET # BLD AUTO: 188 THOUSAND/UL (ref 140–400)
PMV BLD REES-ECKER: 9.1 FL (ref 7.5–12.5)
POTASSIUM SERPL-SCNC: 3.7 MMOL/L (ref 3.5–5.3)
PROT SERPL-MCNC: 6.5 G/DL (ref 6.1–8.1)
RBC # BLD AUTO: 5.18 MILLION/UL (ref 4.2–5.8)
SODIUM SERPL-SCNC: 139 MMOL/L (ref 135–146)
TRIGL SERPL-MCNC: 61 MG/DL
WBC # BLD AUTO: 3.1 THOUSAND/UL (ref 3.8–10.8)

## 2022-08-09 ENCOUNTER — OFFICE VISIT (OUTPATIENT)
Dept: INTERNAL MEDICINE CLINIC | Facility: CLINIC | Age: 42
End: 2022-08-09
Payer: COMMERCIAL

## 2022-08-09 VITALS
DIASTOLIC BLOOD PRESSURE: 74 MMHG | OXYGEN SATURATION: 97 % | TEMPERATURE: 98.2 F | BODY MASS INDEX: 20.76 KG/M2 | SYSTOLIC BLOOD PRESSURE: 122 MMHG | WEIGHT: 137 LBS | HEART RATE: 68 BPM | RESPIRATION RATE: 16 BRPM | HEIGHT: 68 IN

## 2022-08-09 DIAGNOSIS — E78.00 HYPERCHOLESTEROLEMIA: Primary | ICD-10-CM

## 2022-08-09 DIAGNOSIS — E61.1 IRON DEFICIENCY: ICD-10-CM

## 2022-08-09 DIAGNOSIS — Z79.899 HIGH RISK MEDICATION USE: ICD-10-CM

## 2022-08-09 DIAGNOSIS — E55.9 VITAMIN D DEFICIENCY: ICD-10-CM

## 2022-08-09 DIAGNOSIS — R25.2 MUSCLE CRAMPS: ICD-10-CM

## 2022-08-09 DIAGNOSIS — K21.9 GASTROESOPHAGEAL REFLUX DISEASE WITHOUT ESOPHAGITIS: ICD-10-CM

## 2022-08-09 DIAGNOSIS — D72.819 LEUKOPENIA, UNSPECIFIED TYPE: ICD-10-CM

## 2022-08-09 PROCEDURE — 99214 OFFICE O/P EST MOD 30 MIN: CPT | Performed by: INTERNAL MEDICINE

## 2022-08-09 RX ORDER — MULTIVITAMIN
1 TABLET ORAL DAILY
COMMUNITY

## 2022-08-09 NOTE — PATIENT INSTRUCTIONS
Patient was advised to continue present medications  discussed with the patient medications and laboratory data in detail  Follow-up with me in 3 months or as advised  If any blood test was ordered please do 1 week prior to next appointment unless advise to get earlier    If you have any questions please call the office [phone]

## 2022-08-09 NOTE — PROGRESS NOTES
Assessment/Plan:    1  Hypercholesterolemia  Assessment & Plan:  Well controlled  Cholesterol 184, triglyceride 61, HDL 66,  so advised to continue present dose of simvastatin 10 mg daily  Continue low-cholesterol low saturated diet    Orders:  -     CK; Future    2  Vitamin D deficiency  Assessment & Plan:  Vitamin-D deficiency resolved after being on vitamin-D supplement  Vitamin-D level increased from 26-34 he takes vitamin-D mainly with multivitamin      3  Leukopenia, unspecified type  Assessment & Plan:  WBC stable  His WBC was 2 9 in 2012 and now his WBC 3 1 so has been stable for last almost 10 years  Has a normal hemoglobin and normal platelet counts  Had seen 2 different hematologist   Since it is stable will follow and observe  4  Iron deficiency  Assessment & Plan:  His hemoglobin is 14 8 but his ferritin count is 13  He just recently started after blood test iron treatment he takes ferrous sulfate 4 times per week he denies any blood in the stool dark-colored stool  Had seen GI evaluation including EGD colonoscopy  Doubt Gl loss  Since he gets constipation from iron therapy and hemoglobin is normal I advised him to take only every other day iron tablet      Orders:  -     CBC and differential; Future  -     Ferritin; Future    5  Muscle cramps  Assessment & Plan:  He gets muscle cramps of legs mainly at nighttime  Could be from low ferritin versus questionable from a simvastatin but he does not want to stop simvastatin due to significant positive family history of coronary artery disease  Will check CPK  His CPK was normal in the past   discussed with the patient to add Co Q10 100 mg tablet to see if that makes any difference in in his symptoms  Orders:  -     CBC and differential; Future  -     CK; Future    6  BMI 21 0-21 9, adult    7  Gastroesophageal reflux disease without esophagitis  Assessment & Plan:  He takes p r n  famotidine which is effective  Advised to watch diet for spicy foods, caffeinated beverages, alcoholic beverages, soda, citrus fluids and foods  Advised for reflux precautions  8  High risk medication use  -     CK; Future          Subjective:  Patient presents for follow-up      Patient ID: Rikki Hernandez is a 36 y o  male  HPI   76-year-old male patient presents for follow-up of his medical problems  He denies any chest pain, shortness a of breath, pain in abdomen  Denies any cough, fever, chills  Denies any nausea vomiting, diarrhea  He gets muscle cramps of the legs mainly at nighttime  Denies any blood in the stool or dark colored stool  He got his COVID-19 vaccination including 1 booster dose  The following portions of the patient's history were reviewed and updated as appropriate:     Past Medical History:  He has a past medical history of BMI 21 0-21 9, adult (3/2/2021), BMI 21 0-21 9, adult (2/8/2022), Chest pain (09/21/2021), Chest pain, atypical, Esophageal reflux, GE reflux (02/28/2021), GERD (gastroesophageal reflux disease), Hypercholesterolemia (2/28/2021), Hypercholesterolemia (2/8/2022), Iron deficiency (2/28/2021), Iron deficiency (2/8/2022), Leukopenia (2/28/2021), Leukopenia (2/8/2022), Lump of skin (3/2/2021), Lumps on the skin (9/21/2021), Muscle cramps (2/8/2022), Nocturia, Pain in both lower legs (9/21/2021), Urinary urgency, and Vitamin D deficiency (8/9/2022)  ,  _______________________________________________________________________  Past Surgical History:   has a past surgical history that includes No past surgeries; Colonoscopy; and Upper gastrointestinal endoscopy  ,  _______________________________________________________________________  Family History:  family history includes Colon cancer in his maternal grandfather and maternal uncle; Coronary artery disease in his father; Stroke in his father ,  _______________________________________________________________________  Social History:   reports that he has never smoked  He has never used smokeless tobacco  He reports that he does not drink alcohol and does not use drugs  ,  _______________________________________________________________________  Allergies:  has No Known Allergies     _______________________________________________________________________  Current Outpatient Medications   Medication Sig Dispense Refill    Multiple Vitamin (multivitamin) tablet Take 1 tablet by mouth daily      famotidine (PEPCID) 40 MG tablet Take 1 tablet (40 mg total) by mouth daily at bedtime as needed for heartburn 30 tablet 1    ferrous sulfate 325 (65 Fe) mg tablet Take 325 mg by mouth 4 (four) times a week      simvastatin (ZOCOR) 10 mg tablet Take 1 tablet (10 mg total) by mouth daily 90 tablet 1     No current facility-administered medications for this visit      _______________________________________________________________________  Review of Systems   Constitutional: Negative for chills, fatigue and fever  HENT: Negative for congestion, ear pain, hearing loss, nosebleeds, sinus pain, sore throat and trouble swallowing  Eyes: Negative for discharge, redness and visual disturbance  Respiratory: Negative for cough, chest tightness and shortness of breath  Cardiovascular: Negative for chest pain and palpitations  Gastrointestinal: Negative for abdominal pain, blood in stool, constipation, diarrhea, nausea and vomiting  Genitourinary: Negative for dysuria, flank pain, frequency and hematuria  Musculoskeletal: Positive for myalgias (Like muscle cramps in the legs at night)  Negative for arthralgias and neck pain  Skin: Negative for color change and rash  Neurological: Negative for dizziness, speech difficulty, weakness and headaches  Hematological: Does not bruise/bleed easily  Psychiatric/Behavioral: Negative for agitation and behavioral problems             Objective:  Vitals:    08/09/22 1421   BP: 122/74   BP Location: Left arm   Patient Position: Sitting   Cuff Size: Adult   Pulse: 68   Resp: 16   Temp: 98 2 °F (36 8 °C)   TempSrc: Tympanic   SpO2: 97%   Weight: 62 1 kg (137 lb)   Height: 5' 7 5" (1 715 m)     Body mass index is 21 14 kg/m²  Physical Exam  Vitals and nursing note reviewed  Constitutional:       General: He is not in acute distress  Appearance: Normal appearance  HENT:      Head: Normocephalic and atraumatic  Right Ear: Ear canal and external ear normal       Left Ear: Ear canal and external ear normal       Nose: Nose normal       Mouth/Throat:      Mouth: Mucous membranes are moist    Eyes:      General: No scleral icterus  Right eye: No discharge  Left eye: No discharge  Extraocular Movements: Extraocular movements intact  Conjunctiva/sclera: Conjunctivae normal    Cardiovascular:      Rate and Rhythm: Normal rate and regular rhythm  Pulses: Normal pulses  Heart sounds: No murmur heard  Pulmonary:      Effort: Pulmonary effort is normal  No respiratory distress  Breath sounds: Normal breath sounds  Abdominal:      General: Bowel sounds are normal       Palpations: Abdomen is soft  Tenderness: There is no abdominal tenderness  Musculoskeletal:         General: Normal range of motion  Cervical back: Normal range of motion and neck supple  No muscular tenderness  Right lower leg: No edema  Left lower leg: No edema  Skin:     General: Skin is warm  Findings: No rash  Neurological:      General: No focal deficit present  Mental Status: He is alert and oriented to person, place, and time  Motor: No weakness  Coordination: Coordination normal    Psychiatric:         Mood and Affect: Mood normal          Behavior: Behavior normal          I spent 30 minutes with the patient today    More than 50% time spent for reviewing of external notes, reviewing of the results of diagnostics test, management of care, patient education and ordering of test

## 2022-08-10 NOTE — ASSESSMENT & PLAN NOTE
He takes p r n  famotidine which is effective  Advised to watch diet for spicy foods, caffeinated beverages, alcoholic beverages, soda, citrus fluids and foods  Advised for reflux precautions

## 2022-08-10 NOTE — ASSESSMENT & PLAN NOTE
Well controlled  Cholesterol 184, triglyceride 61, HDL 66,  so advised to continue present dose of simvastatin 10 mg daily    Continue low-cholesterol low saturated diet

## 2022-08-10 NOTE — ASSESSMENT & PLAN NOTE
He gets muscle cramps of legs mainly at nighttime  Could be from low ferritin versus questionable from a simvastatin but he does not want to stop simvastatin due to significant positive family history of coronary artery disease  Will check CPK  His CPK was normal in the past   discussed with the patient to add Co Q10 100 mg tablet to see if that makes any difference in in his symptoms

## 2022-08-10 NOTE — ASSESSMENT & PLAN NOTE
WBC stable  His WBC was 2 9 in 2012 and now his WBC 3 1 so has been stable for last almost 10 years  Has a normal hemoglobin and normal platelet counts  Had seen 2 different hematologist   Since it is stable will follow and observe

## 2022-08-10 NOTE — ASSESSMENT & PLAN NOTE
Vitamin-D deficiency resolved after being on vitamin-D supplement    Vitamin-D level increased from 26-34 he takes vitamin-D mainly with multivitamin

## 2022-08-10 NOTE — ASSESSMENT & PLAN NOTE
His hemoglobin is 14 8 but his ferritin count is 13  He just recently started after blood test iron treatment he takes ferrous sulfate 4 times per week he denies any blood in the stool dark-colored stool  Had seen GI evaluation including EGD colonoscopy  Doubt Gl loss    Since he gets constipation from iron therapy and hemoglobin is normal I advised him to take only every other day iron tablet

## 2022-11-02 DIAGNOSIS — E78.00 PURE HYPERCHOLESTEROLEMIA, UNSPECIFIED: ICD-10-CM

## 2022-11-02 RX ORDER — SIMVASTATIN 10 MG
TABLET ORAL
Qty: 90 TABLET | Refills: 1 | Status: SHIPPED | OUTPATIENT
Start: 2022-11-02 | End: 2022-11-08 | Stop reason: SDUPTHER

## 2022-11-04 LAB
BASOPHILS # BLD AUTO: 19 CELLS/UL (ref 0–200)
BASOPHILS NFR BLD AUTO: 0.6 %
CK SERPL-CCNC: 112 U/L (ref 44–196)
EOSINOPHIL # BLD AUTO: 70 CELLS/UL (ref 15–500)
EOSINOPHIL NFR BLD AUTO: 2.2 %
ERYTHROCYTE [DISTWIDTH] IN BLOOD BY AUTOMATED COUNT: 12.9 % (ref 11–15)
FERRITIN SERPL-MCNC: 13 NG/ML (ref 38–380)
HCT VFR BLD AUTO: 43.8 % (ref 38.5–50)
HGB BLD-MCNC: 14.7 G/DL (ref 13.2–17.1)
LYMPHOCYTES # BLD AUTO: 1331 CELLS/UL (ref 850–3900)
LYMPHOCYTES NFR BLD AUTO: 41.6 %
MCH RBC QN AUTO: 28.3 PG (ref 27–33)
MCHC RBC AUTO-ENTMCNC: 33.6 G/DL (ref 32–36)
MCV RBC AUTO: 84.2 FL (ref 80–100)
MONOCYTES # BLD AUTO: 336 CELLS/UL (ref 200–950)
MONOCYTES NFR BLD AUTO: 10.5 %
NEUTROPHILS # BLD AUTO: 1443 CELLS/UL (ref 1500–7800)
NEUTROPHILS NFR BLD AUTO: 45.1 %
PLATELET # BLD AUTO: 212 THOUSAND/UL (ref 140–400)
PMV BLD REES-ECKER: 9.6 FL (ref 7.5–12.5)
RBC # BLD AUTO: 5.2 MILLION/UL (ref 4.2–5.8)
WBC # BLD AUTO: 3.2 THOUSAND/UL (ref 3.8–10.8)

## 2022-11-08 ENCOUNTER — OFFICE VISIT (OUTPATIENT)
Dept: INTERNAL MEDICINE CLINIC | Facility: CLINIC | Age: 42
End: 2022-11-08

## 2022-11-08 VITALS
TEMPERATURE: 98.5 F | WEIGHT: 137 LBS | HEIGHT: 67 IN | RESPIRATION RATE: 14 BRPM | BODY MASS INDEX: 21.5 KG/M2 | HEART RATE: 67 BPM | DIASTOLIC BLOOD PRESSURE: 80 MMHG | OXYGEN SATURATION: 98 % | SYSTOLIC BLOOD PRESSURE: 122 MMHG

## 2022-11-08 DIAGNOSIS — K21.9 GASTROESOPHAGEAL REFLUX DISEASE WITHOUT ESOPHAGITIS: ICD-10-CM

## 2022-11-08 DIAGNOSIS — Z79.899 HIGH RISK MEDICATION USE: ICD-10-CM

## 2022-11-08 DIAGNOSIS — E78.00 HYPERCHOLESTEROLEMIA: ICD-10-CM

## 2022-11-08 DIAGNOSIS — E61.1 IRON DEFICIENCY: ICD-10-CM

## 2022-11-08 DIAGNOSIS — Z00.00 ANNUAL PHYSICAL EXAM: Primary | ICD-10-CM

## 2022-11-08 DIAGNOSIS — D72.819 LEUKOPENIA, UNSPECIFIED TYPE: ICD-10-CM

## 2022-11-08 DIAGNOSIS — D17.20 LIPOMA OF UPPER EXTREMITY, UNSPECIFIED LATERALITY: ICD-10-CM

## 2022-11-08 RX ORDER — SIMVASTATIN 10 MG
10 TABLET ORAL DAILY
Qty: 90 TABLET | Refills: 1 | Status: SHIPPED | OUTPATIENT
Start: 2022-11-08

## 2022-11-08 NOTE — ASSESSMENT & PLAN NOTE
WBC 3 2 stable  Had evaluation by hematologist   Will observe and follow-up    Hemoglobin, platelet counts normal

## 2022-11-08 NOTE — ASSESSMENT & PLAN NOTE
Well controlled  Cholesterol 184, triglyceride 61, HDL 60,  advised to continue simvastatin 10 mg daily advised to continue low-cholesterol low saturated diet

## 2022-11-08 NOTE — PATIENT INSTRUCTIONS

## 2022-11-08 NOTE — ASSESSMENT & PLAN NOTE
His hemoglobin 14 7 and ferritin 13  Unable to tolerate p o  iron therapy  Asymptomatic had GI evaluation including EGD colonoscopy had a hematological evaluation in the past   He will try 1 iron pill once a week

## 2022-11-08 NOTE — PROGRESS NOTES
[facility] INTERNAL MEDICINE    NAME: Baldomero Hatchet  AGE: 36 y o  SEX: male  : 1980     DATE: 2022     Assessment and Plan:     Problem List Items Addressed This Visit        Digestive    GE reflux     Since he changes diet his symptoms are much better  Now occasionally takes famotidine  Other    Hypercholesterolemia     Well controlled  Cholesterol 184, triglyceride 61, HDL 60,  advised to continue simvastatin 10 mg daily advised to continue low-cholesterol low saturated diet  Relevant Medications    simvastatin (ZOCOR) 10 mg tablet    Other Relevant Orders    Lipid panel    Hepatic function panel    Leukopenia     WBC 3 2 stable  Had evaluation by hematologist   Will observe and follow-up  Hemoglobin, platelet counts normal          Relevant Orders    CBC and differential    Iron deficiency     His hemoglobin 14 7 and ferritin 13  Unable to tolerate p o  iron therapy  Asymptomatic had GI evaluation including EGD colonoscopy had a hematological evaluation in the past   He will try 1 iron pill once a week  Relevant Orders    CBC and differential    Ferritin    BMI 21 0-21 9, adult    Lipoma of upper extremity     Has a scattered soft tissue lump on upper extremities and 1 on left thigh most likely lipoma  He had seen surgeon Nico Terrazas in the past had a biopsy was due to lipoma  Other Visit Diagnoses     Annual physical exam    -  Primary    High risk medication use        Relevant Orders    Hepatic function panel          Immunizations and preventive care screenings were discussed with patient today  Appropriate education was printed on patient's after visit summary  Discussed risks and benefits of prostate cancer screening   We discussed the controversial history of PSA screening for prostate cancer in the [place] as well as the risk of over detection and over treatment of prostate cancer by way of PSA screening  The patient understands that PSA blood testing is an imperfect way to screen for prostate cancer and that elevated PSA levels in the blood may also be caused by infection, inflammation, prostatic trauma or manipulation, urological procedures, or by benign prostatic enlargement  The role of the digital rectal examination in prostate cancer screening was also discussed and I discussed with him that there is large interobserver variability in the findings of digital rectal examination  Counseling:  · Alcohol/drug use: discussed moderation in alcohol intake, the recommendations for healthy alcohol use, and avoidance of illicit drug use  No follow-ups on file  Chief Complaint:     Chief Complaint   Patient presents with   • Annual Exam      History of Present Illness:     Adult Annual Physical   Patient here for a comprehensive physical exam  The patient reports no problems  Diet and Physical Activity  · Diet/Nutrition: well balanced diet  · Exercise: walking  Depression Screening  PHQ-2/9 Depression Screening    Little interest or pleasure in doing things: 0 - not at all  Feeling down, depressed, or hopeless: 0 - not at all  PHQ-2 Score: 0  PHQ-2 Interpretation: Negative depression screen       General Health  · Sleep: sleeps well  · Hearing: normal - bilateral   · Vision: no vision problems  · Dental: regular dental visits   Health  · Symptoms include: none     Review of Systems:     Review of Systems   Constitutional: Negative for chills, fatigue and fever  HENT: Negative for congestion, ear pain, hearing loss, nosebleeds, sinus pain, sore throat and trouble swallowing  Eyes: Negative for discharge, redness and visual disturbance  Respiratory: Negative for cough, chest tightness and shortness of breath  Cardiovascular: Negative for chest pain and palpitations     Gastrointestinal: Negative for abdominal pain, blood in stool, constipation, diarrhea, nausea and vomiting  Genitourinary: Negative for dysuria, flank pain, frequency and hematuria  Musculoskeletal: Negative for arthralgias, myalgias and neck pain  Skin: Negative for color change and rash  Neurological: Negative for dizziness, speech difficulty, weakness and headaches  Hematological: Does not bruise/bleed easily  Psychiatric/Behavioral: Negative for agitation and behavioral problems  Past Medical History:     Past Medical History:   Diagnosis Date   • BMI 21 0-21 9, adult 3/2/2021   • BMI 21 0-21 9, adult 2/8/2022   • Chest pain 09/21/2021   • Chest pain, atypical    • Esophageal reflux    • GE reflux 02/28/2021   • GERD (gastroesophageal reflux disease)    • Hypercholesterolemia 2/28/2021   • Hypercholesterolemia 2/8/2022   • Iron deficiency 2/28/2021   • Iron deficiency 2/8/2022   • Leukopenia 2/28/2021   • Leukopenia 2/8/2022   • Lipoma of upper extremity 11/8/2022   • Lump of skin 3/2/2021   • Lumps on the skin 9/21/2021   • Muscle cramps 2/8/2022   • Nocturia    • Pain in both lower legs 9/21/2021   • Urinary urgency    • Vitamin D deficiency 8/9/2022      Past Surgical History:     Past Surgical History:   Procedure Laterality Date   • COLONOSCOPY     • NO PAST SURGERIES     • UPPER GASTROINTESTINAL ENDOSCOPY        Family History:     Family History   Problem Relation Age of Onset   • Stroke Father    • Coronary artery disease Father    • Colon cancer Maternal Grandfather    • Colon cancer Maternal Uncle       Social History:     Social History     Socioeconomic History   • Marital status: /Civil Union     Spouse name: None   • Number of children: None   • Years of education: None   • Highest education level: None   Occupational History   • Occupation: Has own business    Tobacco Use   • Smoking status: Never Smoker   • Smokeless tobacco: Never Used   Vaping Use   • Vaping Use: None   Substance and Sexual Activity   • Alcohol use:  No • Drug use: No   • Sexual activity: Not Currently   Other Topics Concern   • None   Social History Narrative    [facility]      Social Determinants of Health     Financial Resource Strain: Not on file   Food Insecurity: Not on file   Transportation Needs: Not on file   Physical Activity: Not on file   Stress: Not on file   Social Connections: Not on file   Intimate Partner Violence: Not on file   Housing Stability: Not on file      Current Medications:     Current Outpatient Medications   Medication Sig Dispense Refill   • simvastatin (ZOCOR) 10 mg tablet Take 1 tablet (10 mg total) by mouth daily 90 tablet 1   • famotidine (PEPCID) 40 MG tablet Take 1 tablet (40 mg total) by mouth daily at bedtime as needed for heartburn 30 tablet 1     No current facility-administered medications for this visit  Allergies:     No Known Allergies   Physical Exam:     /80 (BP Location: Left arm, Patient Position: Sitting, Cuff Size: Adult)   Pulse 67   Temp 98 5 °F (36 9 °C) (Tympanic)   Resp 14   Ht 5' 7" (1 702 m)   Wt 62 1 kg (137 lb)   SpO2 98%   BMI 21 46 kg/m²     Physical Exam  Vitals and nursing note reviewed  Constitutional:       General: He is not in acute distress  Appearance: He is well-developed and normal weight  HENT:      Head: Normocephalic and atraumatic  Right Ear: Tympanic membrane, ear canal and external ear normal       Left Ear: Tympanic membrane, ear canal and external ear normal       Nose: Nose normal       Mouth/Throat:      Mouth: Mucous membranes are moist       Pharynx: Oropharynx is clear  Eyes:      General: No scleral icterus  Right eye: No discharge  Left eye: No discharge  Extraocular Movements: Extraocular movements intact  Conjunctiva/sclera: Conjunctivae normal       Pupils: Pupils are equal, round, and reactive to light  Cardiovascular:      Rate and Rhythm: Normal rate and regular rhythm  Pulses: Normal pulses  Heart sounds: Normal heart sounds  No murmur heard  Pulmonary:      Effort: Pulmonary effort is normal  No respiratory distress  Breath sounds: Normal breath sounds  Abdominal:      General: Bowel sounds are normal  There is no distension  Palpations: Abdomen is soft  Tenderness: There is no abdominal tenderness  Musculoskeletal:         General: Normal range of motion  Cervical back: Normal range of motion and neck supple  Right lower leg: No edema  Left lower leg: No edema  Skin:     General: Skin is warm and dry  Comments: Has a scattered soft tissue lumps, nontender on upper extremities and 1 on left thigh   Neurological:      General: No focal deficit present  Mental Status: He is alert and oriented to person, place, and time  Motor: No weakness        Coordination: Coordination normal    Psychiatric:         Mood and Affect: Mood normal          Behavior: Behavior normal             Fred Talbot MD  [address] [facility]

## 2022-11-08 NOTE — ASSESSMENT & PLAN NOTE
Has a scattered soft tissue lump on upper extremities and 1 on left thigh most likely lipoma  He had seen surgeon Jahaira Abraham in the past had a biopsy was due to lipoma

## 2023-05-02 DIAGNOSIS — E78.00 HYPERCHOLESTEROLEMIA: ICD-10-CM

## 2023-05-02 RX ORDER — SIMVASTATIN 10 MG
TABLET ORAL
Qty: 90 TABLET | Refills: 1 | Status: SHIPPED | OUTPATIENT
Start: 2023-05-02

## 2023-05-04 LAB
ALBUMIN SERPL-MCNC: 4.4 G/DL (ref 3.6–5.1)
ALBUMIN/GLOB SERPL: 2 (CALC) (ref 1–2.5)
ALP SERPL-CCNC: 57 U/L (ref 36–130)
ALT SERPL-CCNC: 14 U/L (ref 9–46)
AST SERPL-CCNC: 18 U/L (ref 10–40)
BASOPHILS # BLD AUTO: 22 CELLS/UL (ref 0–200)
BASOPHILS NFR BLD AUTO: 0.7 %
BILIRUB DIRECT SERPL-MCNC: 0.1 MG/DL
BILIRUB INDIRECT SERPL-MCNC: 0.5 MG/DL (CALC) (ref 0.2–1.2)
BILIRUB SERPL-MCNC: 0.6 MG/DL (ref 0.2–1.2)
CHOLEST SERPL-MCNC: 185 MG/DL
CHOLEST/HDLC SERPL: 2.8 (CALC)
EOSINOPHIL # BLD AUTO: 71 CELLS/UL (ref 15–500)
EOSINOPHIL NFR BLD AUTO: 2.3 %
ERYTHROCYTE [DISTWIDTH] IN BLOOD BY AUTOMATED COUNT: 12.7 % (ref 11–15)
FERRITIN SERPL-MCNC: 12 NG/ML (ref 38–380)
GLOBULIN SER CALC-MCNC: 2.2 G/DL (CALC) (ref 1.9–3.7)
HCT VFR BLD AUTO: 44.3 % (ref 38.5–50)
HDLC SERPL-MCNC: 66 MG/DL
HGB BLD-MCNC: 14.7 G/DL (ref 13.2–17.1)
LDLC SERPL CALC-MCNC: 103 MG/DL (CALC)
LYMPHOCYTES # BLD AUTO: 1243 CELLS/UL (ref 850–3900)
LYMPHOCYTES NFR BLD AUTO: 40.1 %
MCH RBC QN AUTO: 28.1 PG (ref 27–33)
MCHC RBC AUTO-ENTMCNC: 33.2 G/DL (ref 32–36)
MCV RBC AUTO: 84.5 FL (ref 80–100)
MONOCYTES # BLD AUTO: 344 CELLS/UL (ref 200–950)
MONOCYTES NFR BLD AUTO: 11.1 %
NEUTROPHILS # BLD AUTO: 1420 CELLS/UL (ref 1500–7800)
NEUTROPHILS NFR BLD AUTO: 45.8 %
NONHDLC SERPL-MCNC: 119 MG/DL (CALC)
PLATELET # BLD AUTO: 212 THOUSAND/UL (ref 140–400)
PMV BLD REES-ECKER: 9.4 FL (ref 7.5–12.5)
PROT SERPL-MCNC: 6.6 G/DL (ref 6.1–8.1)
RBC # BLD AUTO: 5.24 MILLION/UL (ref 4.2–5.8)
TRIGL SERPL-MCNC: 70 MG/DL
WBC # BLD AUTO: 3.1 THOUSAND/UL (ref 3.8–10.8)

## 2023-05-09 ENCOUNTER — OFFICE VISIT (OUTPATIENT)
Dept: INTERNAL MEDICINE CLINIC | Facility: CLINIC | Age: 43
End: 2023-05-09

## 2023-05-09 VITALS
HEART RATE: 67 BPM | RESPIRATION RATE: 16 BRPM | SYSTOLIC BLOOD PRESSURE: 124 MMHG | BODY MASS INDEX: 21.5 KG/M2 | HEIGHT: 67 IN | TEMPERATURE: 97.9 F | OXYGEN SATURATION: 96 % | WEIGHT: 137 LBS | DIASTOLIC BLOOD PRESSURE: 80 MMHG

## 2023-05-09 DIAGNOSIS — E61.1 IRON DEFICIENCY: ICD-10-CM

## 2023-05-09 DIAGNOSIS — E55.9 VITAMIN D DEFICIENCY: ICD-10-CM

## 2023-05-09 DIAGNOSIS — D72.818 OTHER DECREASED WHITE BLOOD CELL (WBC) COUNT: ICD-10-CM

## 2023-05-09 DIAGNOSIS — K21.9 GASTROESOPHAGEAL REFLUX DISEASE WITHOUT ESOPHAGITIS: ICD-10-CM

## 2023-05-09 DIAGNOSIS — R25.2 MUSCLE CRAMPS: ICD-10-CM

## 2023-05-09 DIAGNOSIS — D17.20 LIPOMA OF UPPER EXTREMITY, UNSPECIFIED LATERALITY: ICD-10-CM

## 2023-05-09 DIAGNOSIS — E78.00 HYPERCHOLESTEROLEMIA: Primary | ICD-10-CM

## 2023-05-09 RX ORDER — MULTIVITAMIN
1 TABLET ORAL DAILY
COMMUNITY

## 2023-05-09 NOTE — ASSESSMENT & PLAN NOTE
Has a scattered upper extremity lipomas  Had seen by surgeon Dr Patria Trujillo in the past and had a removal of few of them

## 2023-05-09 NOTE — ASSESSMENT & PLAN NOTE
Occasionally takes famotidine  Has been watching diet and taking reflux precautions  Had GI evaluation and EGD

## 2023-05-09 NOTE — PROGRESS NOTES
Assessment/Plan:    1  Hypercholesterolemia  Assessment & Plan:  Well-controlled  Cholesterol 185, triglycerides 70, HDL 66,  on simvastatin 10 mg and low-cholesterol diet  He gets muscle cramps worse at night of both legs rule out from a statin therapy simvastatin  Advised to stop rosuvastatin for next 3 to 4 months and observe off medication  If necessary will start Zetia  Orders:  -     Lipid panel; Future    2  Gastroesophageal reflux disease without esophagitis  Assessment & Plan:  Occasionally takes famotidine  Has been watching diet and taking reflux precautions  Had GI evaluation and EGD  Orders:  -     Basic metabolic panel; Future    3  Other decreased white blood cell (WBC) count  Assessment & Plan:  WBC 3 1  Stable  Had evaluation by 2 different hematologist   Will observe and follow  4  Iron deficiency  Assessment & Plan:  His hemoglobin is 14 7  But ferritin is 12  He had a GI evaluation including EGD colonoscopy  Denies blood in the stool or dark-colored stool  He cannot tolerate p o  iron therapy causes constipation  But he will try to take at least 1 iron pill once a week  I advised to take vitamin C with the iron pill  We will follow CBC ferritin  Discussed about IV iron therapy but since his hemoglobin is 14 7 he prefers at present no IV iron  Orders:  -     CBC and differential; Future  -     Ferritin; Future    5  Vitamin D deficiency  Assessment & Plan:  His vitamin D level improved from 26-34 but he stopped taking vitamin D supplement except he takes multivitamin with vitamin D  We will follow vitamin D level and advise accordingly  Orders:  -     Vitamin D 25 hydroxy; Future  -     Basic metabolic panel; Future    6  Muscle cramps  Assessment & Plan:  Muscle cramps worse at night both lower extremities rule out from simvastatin versus run from iron deficiency although hemoglobin is 14 7  We will stop simvastatin for now    And try to supplement iron as above   Advised to maintain hydration and fluid intake  Orders:  -     Basic metabolic panel; Future    7  BMI 21 0-21 9, adult    8  Lipoma of upper extremity, unspecified laterality  Assessment & Plan:  Has a scattered upper extremity lipomas  Had seen by surgeon Dr James Su in the past and had a removal of few of them  Subjective: Patient presents for follow-up  Patient ID: Toñito Zapata is a 39 y o  male  HPI   24-year-old male patient presents to follow-up his medical problems  He denies any chest pain, shortness of breath, pain in abdomen  Denies any cough, fever, chills  Denies any nausea vomiting diarrhea or constipation  Patient complain of muscle cramps of mainly of both lower extremities worse at night  The following portions of the patient's history were reviewed and updated as appropriate:     Past Medical History:  He has a past medical history of BMI 21 0-21 9, adult (3/2/2021), BMI 21 0-21 9, adult (2/8/2022), Chest pain (09/21/2021), Chest pain, atypical, Esophageal reflux, GE reflux (02/28/2021), GERD (gastroesophageal reflux disease), Hypercholesterolemia (2/28/2021), Hypercholesterolemia (2/8/2022), Iron deficiency (2/28/2021), Iron deficiency (2/8/2022), Leukopenia (2/28/2021), Leukopenia (2/8/2022), Lipoma of upper extremity (11/8/2022), Lump of skin (3/2/2021), Lumps on the skin (9/21/2021), Muscle cramps (2/8/2022), Nocturia, Pain in both lower legs (9/21/2021), Urinary urgency, and Vitamin D deficiency (8/9/2022)  ,  _______________________________________________________________________  Past Surgical History:   has a past surgical history that includes No past surgeries; Colonoscopy; and Upper gastrointestinal endoscopy  ,  _______________________________________________________________________  Family History:  family history includes Colon cancer in his maternal grandfather and maternal uncle; Coronary artery disease in his father; Stroke in his father ,  _______________________________________________________________________  Social History:   reports that he has never smoked  He has never used smokeless tobacco  He reports that he does not drink alcohol and does not use drugs  ,  _______________________________________________________________________  Allergies:  has No Known Allergies     _______________________________________________________________________  Current Outpatient Medications   Medication Sig Dispense Refill   • Coenzyme Q10 (Co Q 10) 100 MG CAPS Take 1 capsule by mouth in the morning     • famotidine (PEPCID) 40 MG tablet Take 1 tablet (40 mg total) by mouth daily at bedtime as needed for heartburn 30 tablet 1   • Multiple Vitamin (multivitamin) tablet Take 1 tablet by mouth daily     • simvastatin (ZOCOR) 10 mg tablet TAKE 1 TABLET BY MOUTH EVERY DAY 90 tablet 1     No current facility-administered medications for this visit      _______________________________________________________________________  Review of Systems   Constitutional: Negative for chills, fatigue and fever  HENT: Negative for congestion, ear pain, hearing loss, nosebleeds, sinus pain, sore throat and trouble swallowing  Eyes: Negative for discharge, redness and visual disturbance  Respiratory: Negative for cough, chest tightness and shortness of breath  Cardiovascular: Negative for chest pain and palpitations  Gastrointestinal: Negative for abdominal pain, blood in stool, constipation, diarrhea, nausea and vomiting  Genitourinary: Negative for dysuria, flank pain, frequency and hematuria  Musculoskeletal: Positive for myalgias ( Muscle cramps of both lower extremities worse at night )  Negative for arthralgias and neck pain  Skin: Negative for color change and rash  Neurological: Negative for dizziness, speech difficulty, weakness and headaches  Hematological: Does not bruise/bleed easily     Psychiatric/Behavioral: Negative for agitation and "behavioral problems  Objective:  Vitals:    05/09/23 1400   BP: 124/80   BP Location: Left arm   Patient Position: Sitting   Cuff Size: Adult   Pulse: 67   Resp: 16   Temp: 97 9 °F (36 6 °C)   TempSrc: Temporal   SpO2: 96%   Weight: 62 1 kg (137 lb)   Height: 5' 7\" (1 702 m)     Body mass index is 21 46 kg/m²  Physical Exam  Vitals and nursing note reviewed  Constitutional:       General: He is not in acute distress  Appearance: Normal appearance  HENT:      Head: Normocephalic and atraumatic  Right Ear: Ear canal and external ear normal       Left Ear: Ear canal and external ear normal       Nose: Nose normal       Mouth/Throat:      Mouth: Mucous membranes are moist    Eyes:      General: No scleral icterus  Right eye: No discharge  Left eye: No discharge  Extraocular Movements: Extraocular movements intact  Conjunctiva/sclera: Conjunctivae normal    Cardiovascular:      Rate and Rhythm: Normal rate and regular rhythm  Pulses: Normal pulses  Heart sounds: No murmur heard  Pulmonary:      Effort: Pulmonary effort is normal  No respiratory distress  Breath sounds: Normal breath sounds  No wheezing, rhonchi or rales  Abdominal:      General: Bowel sounds are normal  There is no distension  Palpations: Abdomen is soft  Tenderness: There is no abdominal tenderness  Musculoskeletal:         General: Normal range of motion  Cervical back: Normal range of motion and neck supple  No muscular tenderness  Right lower leg: No edema  Left lower leg: No edema  Comments: He has a scattered soft and nontender lumps on both upper extremities secondary to lipoma  Skin:     General: Skin is warm  Findings: No rash  Neurological:      General: No focal deficit present  Mental Status: He is alert and oriented to person, place, and time  Cranial Nerves: No cranial nerve deficit  Motor: No weakness        " Coordination: Coordination normal       Gait: Gait normal    Psychiatric:         Mood and Affect: Mood normal          Behavior: Behavior normal          I spent 30 minutes with the patient today    More than 50% time spent for reviewing of external notes, reviewing of the results of diagnostics test, management of care, patient education and ordering of test

## 2023-05-09 NOTE — ASSESSMENT & PLAN NOTE
His vitamin D level improved from 26-34 but he stopped taking vitamin D supplement except he takes multivitamin with vitamin D  We will follow vitamin D level and advise accordingly

## 2023-05-09 NOTE — ASSESSMENT & PLAN NOTE
Muscle cramps worse at night both lower extremities rule out from simvastatin versus run from iron deficiency although hemoglobin is 14 7  We will stop simvastatin for now  And try to supplement iron as above  Advised to maintain hydration and fluid intake

## 2023-05-09 NOTE — ASSESSMENT & PLAN NOTE
His hemoglobin is 14 7  But ferritin is 12  He had a GI evaluation including EGD colonoscopy  Denies blood in the stool or dark-colored stool  He cannot tolerate p o  iron therapy causes constipation  But he will try to take at least 1 iron pill once a week  I advised to take vitamin C with the iron pill  We will follow CBC ferritin  Discussed about IV iron therapy but since his hemoglobin is 14 7 he prefers at present no IV iron

## 2023-05-09 NOTE — PATIENT INSTRUCTIONS
Patient was advised to continue present medications  discussed with the patient medications and laboratory data in detail  Follow-up with me in 4 months or as advised  If any blood test was ordered please do 1 week prior to next appointment unless advise to get earlier    If you have any questions please call the office [phone]

## 2023-05-09 NOTE — ASSESSMENT & PLAN NOTE
Well-controlled  Cholesterol 185, triglycerides 70, HDL 66,  on simvastatin 10 mg and low-cholesterol diet  He gets muscle cramps worse at night of both legs rule out from a statin therapy simvastatin  Advised to stop rosuvastatin for next 3 to 4 months and observe off medication  If necessary will start Zetia

## 2023-09-19 LAB
25(OH)D3 SERPL-MCNC: 26 NG/ML (ref 30–100)
BASOPHILS # BLD AUTO: 31 CELLS/UL (ref 0–200)
BASOPHILS NFR BLD AUTO: 1 %
BUN SERPL-MCNC: 17 MG/DL (ref 7–25)
BUN/CREAT SERPL: NORMAL (CALC) (ref 6–22)
CALCIUM SERPL-MCNC: 9.3 MG/DL (ref 8.6–10.3)
CHLORIDE SERPL-SCNC: 102 MMOL/L (ref 98–110)
CHOLEST SERPL-MCNC: 249 MG/DL
CHOLEST/HDLC SERPL: 4 (CALC)
CO2 SERPL-SCNC: 30 MMOL/L (ref 20–32)
CREAT SERPL-MCNC: 1.04 MG/DL (ref 0.6–1.29)
EOSINOPHIL # BLD AUTO: 140 CELLS/UL (ref 15–500)
EOSINOPHIL NFR BLD AUTO: 4.5 %
ERYTHROCYTE [DISTWIDTH] IN BLOOD BY AUTOMATED COUNT: 13 % (ref 11–15)
FERRITIN SERPL-MCNC: 21 NG/ML (ref 38–380)
GFR/BSA.PRED SERPLBLD CYS-BASED-ARV: 91 ML/MIN/1.73M2
GLUCOSE SERPL-MCNC: 94 MG/DL (ref 65–99)
HCT VFR BLD AUTO: 44.3 % (ref 38.5–50)
HDLC SERPL-MCNC: 62 MG/DL
HGB BLD-MCNC: 15.1 G/DL (ref 13.2–17.1)
LDLC SERPL CALC-MCNC: 168 MG/DL (CALC)
LYMPHOCYTES # BLD AUTO: 1144 CELLS/UL (ref 850–3900)
LYMPHOCYTES NFR BLD AUTO: 36.9 %
MCH RBC QN AUTO: 28.7 PG (ref 27–33)
MCHC RBC AUTO-ENTMCNC: 34.1 G/DL (ref 32–36)
MCV RBC AUTO: 84.2 FL (ref 80–100)
MONOCYTES # BLD AUTO: 298 CELLS/UL (ref 200–950)
MONOCYTES NFR BLD AUTO: 9.6 %
NEUTROPHILS # BLD AUTO: 1488 CELLS/UL (ref 1500–7800)
NEUTROPHILS NFR BLD AUTO: 48 %
NONHDLC SERPL-MCNC: 187 MG/DL (CALC)
PLATELET # BLD AUTO: 210 THOUSAND/UL (ref 140–400)
PMV BLD REES-ECKER: 9.2 FL (ref 7.5–12.5)
POTASSIUM SERPL-SCNC: 4.4 MMOL/L (ref 3.5–5.3)
RBC # BLD AUTO: 5.26 MILLION/UL (ref 4.2–5.8)
SODIUM SERPL-SCNC: 139 MMOL/L (ref 135–146)
TRIGL SERPL-MCNC: 84 MG/DL
WBC # BLD AUTO: 3.1 THOUSAND/UL (ref 3.8–10.8)

## 2023-09-21 ENCOUNTER — OFFICE VISIT (OUTPATIENT)
Dept: INTERNAL MEDICINE CLINIC | Facility: CLINIC | Age: 43
End: 2023-09-21
Payer: COMMERCIAL

## 2023-09-21 VITALS
OXYGEN SATURATION: 98 % | BODY MASS INDEX: 21.5 KG/M2 | SYSTOLIC BLOOD PRESSURE: 116 MMHG | DIASTOLIC BLOOD PRESSURE: 70 MMHG | HEIGHT: 67 IN | TEMPERATURE: 98.3 F | WEIGHT: 137 LBS | HEART RATE: 84 BPM

## 2023-09-21 DIAGNOSIS — D72.818 OTHER DECREASED WHITE BLOOD CELL (WBC) COUNT: ICD-10-CM

## 2023-09-21 DIAGNOSIS — K21.9 GASTROESOPHAGEAL REFLUX DISEASE WITHOUT ESOPHAGITIS: ICD-10-CM

## 2023-09-21 DIAGNOSIS — R25.2 MUSCLE CRAMPS: ICD-10-CM

## 2023-09-21 DIAGNOSIS — E61.1 IRON DEFICIENCY: ICD-10-CM

## 2023-09-21 DIAGNOSIS — Z79.899 HIGH RISK MEDICATION USE: ICD-10-CM

## 2023-09-21 DIAGNOSIS — E78.00 HYPERCHOLESTEROLEMIA: Primary | ICD-10-CM

## 2023-09-21 DIAGNOSIS — E55.9 VITAMIN D DEFICIENCY: ICD-10-CM

## 2023-09-21 PROCEDURE — 99214 OFFICE O/P EST MOD 30 MIN: CPT | Performed by: INTERNAL MEDICINE

## 2023-09-21 RX ORDER — SIMVASTATIN 10 MG
10 TABLET ORAL
Qty: 90 TABLET | Refills: 1 | Status: SHIPPED | OUTPATIENT
Start: 2023-09-21

## 2023-09-21 RX ORDER — SIMVASTATIN 10 MG
10 TABLET ORAL
COMMUNITY
End: 2023-09-21 | Stop reason: SDUPTHER

## 2023-09-21 RX ORDER — CALCIUM CARBONATE 300MG(750)
1 TABLET,CHEWABLE ORAL DAILY
COMMUNITY

## 2023-09-21 NOTE — ASSESSMENT & PLAN NOTE
Cholesterol increased from 1 85-2 49 LDL increased from 10 3-1 68 since he stopped taking simvastatin. Discussed with the patient to restart the simvastatin as despite he stopped taking simvastatin he still gets muscle cramps in the legs. Patient agreed so started back on simvastatin 10 mg daily. Advised to continue low-cholesterol diet. Will follow lipid panel.
His symptoms are well controlled since he is watching diet. Occasionally takes famotidine.   Had GI evaluation EGD in the past.
Patient states he gets muscle cramps are mainly at nighttime of legs. He stopped taking simvastatin but no significant change. He has been taking of iron tablet once every day. He also tried magnesium tablet but not effective. His vitamin D level is low 26 discussed with the patient to start vitamin D supplement 2000 IU daily. We will will follow up vitamin D level after vitamin D deficit is corrected if he still gets muscle cramps at night there is remote possibility of restless leg for which we will start up medication if symptom persists.
Patient states now he is taking 1 iron tablet daily since then his ferritin is improving. Ferritin level 13-21. Still low. Hemoglobin 15.7. Advised to continue ferrous sulfate once a day. We will follow CBC ferritin iron study.
See under muscle cramps assessment and plan for detail.
WBC 3.1 with normal hemoglobin and platelet counts. Had seen 3 different hematologist.  WBC stable. Will observe and follow.
No

## 2023-09-21 NOTE — PROGRESS NOTES
Assessment/Plan:    1. Hypercholesterolemia  Assessment & Plan:  Cholesterol increased from 1 85-2 49 LDL increased from 10 3-1 68 since he stopped taking simvastatin. Discussed with the patient to restart the simvastatin as despite he stopped taking simvastatin he still gets muscle cramps in the legs. Patient agreed so started back on simvastatin 10 mg daily. Advised to continue low-cholesterol diet. Will follow lipid panel. Orders:  -     simvastatin (ZOCOR) 10 mg tablet; Take 1 tablet (10 mg total) by mouth daily at bedtime  -     Lipid panel; Future  -     CK; Future  -     Hepatic function panel; Future    2. Iron deficiency  Assessment & Plan:  Patient states now he is taking 1 iron tablet daily since then his ferritin is improving. Ferritin level 13-21. Still low. Hemoglobin 15.7. Advised to continue ferrous sulfate once a day. We will follow CBC ferritin iron study. Orders:  -     CBC and differential; Future  -     Iron Panel (Includes Ferritin, Iron Sat%, Iron, and TIBC); Future    3. Other decreased white blood cell (WBC) count  Assessment & Plan:  WBC 3.1 with normal hemoglobin and platelet counts. Had seen 3 different hematologist.  WBC stable. Will observe and follow. 4. Muscle cramps  Assessment & Plan:  Patient states he gets muscle cramps are mainly at nighttime of legs. He stopped taking simvastatin but no significant change. He has been taking of iron tablet once every day. He also tried magnesium tablet but not effective. His vitamin D level is low 26 discussed with the patient to start vitamin D supplement 2000 IU daily. We will will follow up vitamin D level after vitamin D deficit is corrected if he still gets muscle cramps at night there is remote possibility of restless leg for which we will start up medication if symptom persists. 5. Vitamin D deficiency  Assessment & Plan:  See under muscle cramps assessment and plan for detail.     Orders:  -     Vitamin D 25 hydroxy; Future    6. Gastroesophageal reflux disease without esophagitis  Assessment & Plan:  His symptoms are well controlled since he is watching diet. Occasionally takes famotidine. Had GI evaluation EGD in the past.      7. BMI 21.0-21.9, adult    8. High risk medication use  -     CK; Future  -     Hepatic function panel; Future          Subjective: Patient presents for follow-up. Patient ID: Carol Church is a 39 y.o. male. HPI   44-year-old male patient presents to follow-up his medical problems. He denies any chest pain, shortness of breath, pain in abdomen. Denies any cough, fever, chills. Denies nausea vomiting diarrhea or constipation. Denies blood in the stool. He stopped taking simvastatin despite he still gets muscle cramps of legs mainly at nighttime. The following portions of the patient's history were reviewed and updated as appropriate:     Past Medical History:  He has a past medical history of BMI 21.0-21.9, adult (3/2/2021), BMI 21.0-21.9, adult (2/8/2022), Chest pain (09/21/2021), Chest pain, atypical, Esophageal reflux, GE reflux (02/28/2021), GERD (gastroesophageal reflux disease), Hypercholesterolemia (2/28/2021), Hypercholesterolemia (2/8/2022), Iron deficiency (2/28/2021), Iron deficiency (2/8/2022), Leukopenia (2/28/2021), Leukopenia (2/8/2022), Lipoma of upper extremity (11/8/2022), Lump of skin (3/2/2021), Lumps on the skin (9/21/2021), Muscle cramps (2/8/2022), Nocturia, Pain in both lower legs (9/21/2021), Urinary urgency, and Vitamin D deficiency (8/9/2022). ,  _______________________________________________________________________  Past Surgical History:   has a past surgical history that includes No past surgeries; Colonoscopy; and Upper gastrointestinal endoscopy. ,  _______________________________________________________________________  Family History:  family history includes Colon cancer in his maternal grandfather and maternal uncle; Coronary artery disease in his father; Stroke in his father.,  _______________________________________________________________________  Social History:   reports that he has never smoked. He has never used smokeless tobacco. He reports that he does not drink alcohol and does not use drugs. ,  _______________________________________________________________________  Allergies:  has No Known Allergies. .  _______________________________________________________________________  Current Outpatient Medications   Medication Sig Dispense Refill   • famotidine (PEPCID) 40 MG tablet Take 1 tablet (40 mg total) by mouth daily at bedtime as needed for heartburn 30 tablet 1   • Ferrous Sulfate Dried (High Potency Iron) 65 MG TABS Take by mouth in the morning     • Magnesium 400 MG TABS Take 1 tablet by mouth in the morning     • simvastatin (ZOCOR) 10 mg tablet Take 1 tablet (10 mg total) by mouth daily at bedtime 90 tablet 1     No current facility-administered medications for this visit.     _______________________________________________________________________  Review of Systems   Constitutional: Negative for chills, fatigue and fever. HENT: Negative for congestion, ear pain, hearing loss, nosebleeds, sinus pain, sore throat and trouble swallowing. Eyes: Negative for discharge, redness and visual disturbance. Respiratory: Negative for cough, chest tightness and shortness of breath. Cardiovascular: Negative for chest pain and palpitations. Gastrointestinal: Negative for abdominal pain, blood in stool, constipation, diarrhea, nausea and vomiting. Genitourinary: Negative for dysuria, flank pain, frequency and hematuria. Musculoskeletal: Positive for myalgias ( Muscle cramps of the legs mainly at nighttime). Negative for arthralgias and neck pain. Skin: Negative for color change and rash. Neurological: Negative for dizziness, speech difficulty, weakness and headaches. Hematological: Does not bruise/bleed easily. Psychiatric/Behavioral: Negative for agitation and behavioral problems. Objective:  Vitals:    09/21/23 0856   BP: 116/70   BP Location: Left arm   Patient Position: Sitting   Cuff Size: Adult   Pulse: 84   Temp: 98.3 °F (36.8 °C)   TempSrc: Tympanic   SpO2: 98%   Weight: 62.1 kg (137 lb)   Height: 5' 7" (1.702 m)     Body mass index is 21.46 kg/m². Physical Exam  Vitals and nursing note reviewed. Constitutional:       General: He is not in acute distress. Appearance: Normal appearance. HENT:      Head: Normocephalic and atraumatic. Right Ear: Ear canal and external ear normal.      Left Ear: Ear canal and external ear normal.      Nose: Nose normal.      Mouth/Throat:      Mouth: Mucous membranes are moist.   Eyes:      General: No scleral icterus. Right eye: No discharge. Left eye: No discharge. Extraocular Movements: Extraocular movements intact. Conjunctiva/sclera: Conjunctivae normal.   Cardiovascular:      Rate and Rhythm: Normal rate and regular rhythm. Pulses: Normal pulses. Heart sounds: No murmur heard. Pulmonary:      Effort: Pulmonary effort is normal. No respiratory distress. Breath sounds: Normal breath sounds. No wheezing, rhonchi or rales. Abdominal:      General: Bowel sounds are normal. There is no distension. Palpations: Abdomen is soft. Tenderness: There is no abdominal tenderness. Musculoskeletal:         General: No swelling, tenderness or deformity. Normal range of motion. Cervical back: Normal range of motion and neck supple. No muscular tenderness. Right lower leg: No edema. Left lower leg: No edema. Lymphadenopathy:      Cervical: No cervical adenopathy. Skin:     General: Skin is warm. Findings: No rash. Neurological:      General: No focal deficit present. Mental Status: He is alert and oriented to person, place, and time. Motor: No weakness.       Coordination: Coordination normal.      Gait: Gait normal.   Psychiatric:         Mood and Affect: Mood normal.         Behavior: Behavior normal.         I spent 30 minutes with the patient today.   More than 50% time spent for reviewing of external notes, reviewing of the results of diagnostics test, management of care, patient education and ordering of test.

## 2023-09-21 NOTE — PATIENT INSTRUCTIONS
Patient was advised to continue present medications. discussed with the patient medications and laboratory data in detail. Follow-up with me in 4 months or as advised. If any blood test was ordered please do 1 week prior to next appointment unless advise to get earlier.   If you have any questions please call the office [phone]

## 2024-01-22 LAB
25(OH)D3 SERPL-MCNC: 38 NG/ML (ref 30–100)
ALBUMIN SERPL-MCNC: 4.1 G/DL (ref 3.6–5.1)
ALBUMIN/GLOB SERPL: 1.9 (CALC) (ref 1–2.5)
ALP SERPL-CCNC: 62 U/L (ref 36–130)
ALT SERPL-CCNC: 14 U/L (ref 9–46)
AST SERPL-CCNC: 18 U/L (ref 10–40)
BASOPHILS # BLD AUTO: 40 CELLS/UL (ref 0–200)
BASOPHILS NFR BLD AUTO: 1.1 %
BILIRUB DIRECT SERPL-MCNC: 0.1 MG/DL
BILIRUB INDIRECT SERPL-MCNC: 0.4 MG/DL (CALC) (ref 0.2–1.2)
BILIRUB SERPL-MCNC: 0.5 MG/DL (ref 0.2–1.2)
CHOLEST SERPL-MCNC: 159 MG/DL
CHOLEST/HDLC SERPL: 3 (CALC)
CK SERPL-CCNC: 148 U/L (ref 44–196)
EOSINOPHIL # BLD AUTO: 130 CELLS/UL (ref 15–500)
EOSINOPHIL NFR BLD AUTO: 3.6 %
ERYTHROCYTE [DISTWIDTH] IN BLOOD BY AUTOMATED COUNT: 12.7 % (ref 11–15)
FERRITIN SERPL-MCNC: 30 NG/ML (ref 38–380)
GLOBULIN SER CALC-MCNC: 2.2 G/DL (CALC) (ref 1.9–3.7)
HCT VFR BLD AUTO: 43.9 % (ref 38.5–50)
HDLC SERPL-MCNC: 53 MG/DL
HGB BLD-MCNC: 15 G/DL (ref 13.2–17.1)
IRON SATN MFR SERPL: 19 % (CALC) (ref 20–48)
IRON SERPL-MCNC: 70 MCG/DL (ref 50–180)
LDLC SERPL CALC-MCNC: 93 MG/DL (CALC)
LYMPHOCYTES # BLD AUTO: 1271 CELLS/UL (ref 850–3900)
LYMPHOCYTES NFR BLD AUTO: 35.3 %
MCH RBC QN AUTO: 28.8 PG (ref 27–33)
MCHC RBC AUTO-ENTMCNC: 34.2 G/DL (ref 32–36)
MCV RBC AUTO: 84.3 FL (ref 80–100)
MONOCYTES # BLD AUTO: 432 CELLS/UL (ref 200–950)
MONOCYTES NFR BLD AUTO: 12 %
NEUTROPHILS # BLD AUTO: 1728 CELLS/UL (ref 1500–7800)
NEUTROPHILS NFR BLD AUTO: 48 %
NONHDLC SERPL-MCNC: 106 MG/DL (CALC)
PLATELET # BLD AUTO: 245 THOUSAND/UL (ref 140–400)
PMV BLD REES-ECKER: 9.2 FL (ref 7.5–12.5)
PROT SERPL-MCNC: 6.3 G/DL (ref 6.1–8.1)
RBC # BLD AUTO: 5.21 MILLION/UL (ref 4.2–5.8)
TIBC SERPL-MCNC: 376 MCG/DL (CALC) (ref 250–425)
TRIGL SERPL-MCNC: 45 MG/DL
WBC # BLD AUTO: 3.6 THOUSAND/UL (ref 3.8–10.8)

## 2024-01-25 ENCOUNTER — OFFICE VISIT (OUTPATIENT)
Dept: INTERNAL MEDICINE CLINIC | Facility: CLINIC | Age: 44
End: 2024-01-25
Payer: COMMERCIAL

## 2024-01-25 VITALS
WEIGHT: 139 LBS | BODY MASS INDEX: 21.82 KG/M2 | RESPIRATION RATE: 16 BRPM | TEMPERATURE: 98.3 F | HEIGHT: 67 IN | SYSTOLIC BLOOD PRESSURE: 110 MMHG | OXYGEN SATURATION: 98 % | DIASTOLIC BLOOD PRESSURE: 64 MMHG | HEART RATE: 76 BPM

## 2024-01-25 DIAGNOSIS — E55.9 VITAMIN D DEFICIENCY: ICD-10-CM

## 2024-01-25 DIAGNOSIS — E61.1 IRON DEFICIENCY: ICD-10-CM

## 2024-01-25 DIAGNOSIS — Z79.899 HIGH RISK MEDICATION USE: ICD-10-CM

## 2024-01-25 DIAGNOSIS — K21.9 GASTROESOPHAGEAL REFLUX DISEASE WITHOUT ESOPHAGITIS: ICD-10-CM

## 2024-01-25 DIAGNOSIS — Z83.3 FAMILY HISTORY OF DIABETES MELLITUS IN MOTHER: ICD-10-CM

## 2024-01-25 DIAGNOSIS — D72.818 OTHER DECREASED WHITE BLOOD CELL (WBC) COUNT: ICD-10-CM

## 2024-01-25 DIAGNOSIS — E78.00 HYPERCHOLESTEROLEMIA: Primary | ICD-10-CM

## 2024-01-25 DIAGNOSIS — R25.2 MUSCLE CRAMPS: ICD-10-CM

## 2024-01-25 PROCEDURE — 99213 OFFICE O/P EST LOW 20 MIN: CPT | Performed by: INTERNAL MEDICINE

## 2024-01-25 RX ORDER — ASCORBIC ACID 500 MG
500 TABLET ORAL DAILY
COMMUNITY

## 2024-01-25 NOTE — PROGRESS NOTES
Assessment/Plan:    1. Hypercholesterolemia  -     Comprehensive metabolic panel; Future  -     Lipid panel; Future    2. Gastroesophageal reflux disease without esophagitis  -     CBC and differential; Future  -     Comprehensive metabolic panel; Future    3. Iron deficiency  -     CBC and differential; Future  -     Ferritin; Future    4. Other decreased white blood cell (WBC) count  -     CBC and differential; Future    5. Vitamin D deficiency  -     Comprehensive metabolic panel; Future    6. BMI 21.0-21.9, adult    7. High risk medication use  -     Comprehensive metabolic panel; Future    8. Family history of diabetes mellitus in mother  -     Hemoglobin A1C; Future    9. Muscle cramps    Hypercholesteremia significantly improved after the event back on rosuvastatin.  Cholesterol decreased from 2 49-1 59, LDL decreased from 1 68-93 HDL 53 and triglycerides 45 so advised to continue simvastatin 10 mg daily and low-cholesterol low-carb diet.  GE reflux stable he takes only as needed famotidine.  He has been watching diet and taking reflux precautions.  For iron deficiency takes iron tablet.  Hemoglobin 15 and ferritin increased from 21-30.  He had a GI evaluation no evidence of GI bleed.  Has a chronic leukopenia had seen hematology in the past.  Presently is WBC stable with a normal hemoglobin and platelet counts will follow CBC.  Vitamin D deficient resolved after being on vitamin D supplement.  Vitamin D level increased from 26-38 so advised open vitamin D daily.  His mother has a history of diabetes mellitus will check hemoglobin A1c.  His muscle cramps are all resolved after being on vitamin D and ferrous sulfate daily.            Subjective: Patient presents for follow-up.      Patient ID: Gauttamkumar Terri is a 43 y.o. male.    HPI  43-year-old male patient presents to follow-up his medical problems.  He denies any chest pain, shortness of breath, pain in abdomen.  Denies any cough, fever, chills.   Denies any nausea vomiting diarrhea.  Denies any constipation.  No new problems.  Overall doing well.    The following portions of the patient's history were reviewed and updated as appropriate:     Past Medical History:  He has a past medical history of BMI 21.0-21.9, adult (3/2/2021), BMI 21.0-21.9, adult (2/8/2022), Chest pain (09/21/2021), Chest pain, atypical, Esophageal reflux, GE reflux (02/28/2021), GERD (gastroesophageal reflux disease), Hypercholesterolemia (2/28/2021), Hypercholesterolemia (2/8/2022), Iron deficiency (2/28/2021), Iron deficiency (2/8/2022), Leukopenia (2/28/2021), Leukopenia (2/8/2022), Lipoma of upper extremity (11/8/2022), Lump of skin (3/2/2021), Lumps on the skin (9/21/2021), Muscle cramps (2/8/2022), Nocturia, Pain in both lower legs (9/21/2021), Urinary urgency, and Vitamin D deficiency (8/9/2022).,  _______________________________________________________________________  Past Surgical History:   has a past surgical history that includes No past surgeries; Colonoscopy; and Upper gastrointestinal endoscopy.,  _______________________________________________________________________  Family History:  family history includes Colon cancer in his maternal grandfather and maternal uncle; Coronary artery disease in his father; Stroke in his father.,  _______________________________________________________________________  Social History:   reports that he has never smoked. He has never used smokeless tobacco. He reports that he does not drink alcohol and does not use drugs.,  _______________________________________________________________________  Allergies:  has No Known Allergies..  _______________________________________________________________________  Current Outpatient Medications   Medication Sig Dispense Refill    ascorbic acid (VITAMIN C) 500 mg tablet Take 500 mg by mouth daily      Ferrous Sulfate Dried (High Potency Iron) 65 MG TABS Take by mouth in the morning      simvastatin  "(ZOCOR) 10 mg tablet Take 1 tablet (10 mg total) by mouth daily at bedtime 90 tablet 1    VITAMIN D PO Take by mouth Patient unsure of dose      famotidine (PEPCID) 40 MG tablet Take 1 tablet (40 mg total) by mouth daily at bedtime as needed for heartburn 30 tablet 1     No current facility-administered medications for this visit.     _______________________________________________________________________  Review of Systems   Constitutional:  Negative for chills, fatigue and fever.   HENT:  Negative for congestion, ear pain, hearing loss, nosebleeds, sinus pain, sore throat and trouble swallowing.    Eyes:  Negative for discharge, redness and visual disturbance.   Respiratory:  Negative for cough, chest tightness and shortness of breath.    Cardiovascular:  Negative for chest pain and palpitations.   Gastrointestinal:  Negative for abdominal pain, blood in stool, constipation, diarrhea, nausea and vomiting.   Genitourinary:  Negative for dysuria, flank pain, frequency and hematuria.   Musculoskeletal:  Negative for arthralgias, myalgias and neck pain.   Skin:  Negative for color change and rash.   Neurological:  Negative for dizziness, speech difficulty, weakness and headaches.   Hematological:  Does not bruise/bleed easily.   Psychiatric/Behavioral:  Negative for agitation and behavioral problems.            Objective:  Vitals:    01/25/24 0941   BP: 110/64   BP Location: Left arm   Patient Position: Sitting   Cuff Size: Standard   Pulse: 76   Resp: 16   Temp: 98.3 °F (36.8 °C)   TempSrc: Temporal   SpO2: 98%   Weight: 63 kg (139 lb)   Height: 5' 7\" (1.702 m)     Body mass index is 21.77 kg/m².     Physical Exam  Vitals and nursing note reviewed.   Constitutional:       General: He is not in acute distress.     Appearance: Normal appearance.   HENT:      Head: Normocephalic and atraumatic.      Right Ear: Ear canal and external ear normal.      Left Ear: Ear canal and external ear normal.      Nose: Nose normal.    "   Mouth/Throat:      Mouth: Mucous membranes are moist.      Pharynx: Oropharynx is clear.   Eyes:      General: No scleral icterus.        Right eye: No discharge.         Left eye: No discharge.      Extraocular Movements: Extraocular movements intact.      Conjunctiva/sclera: Conjunctivae normal.   Cardiovascular:      Rate and Rhythm: Normal rate and regular rhythm.      Pulses: Normal pulses.      Heart sounds: Normal heart sounds. No murmur heard.  Pulmonary:      Effort: Pulmonary effort is normal. No respiratory distress.      Breath sounds: Normal breath sounds. No wheezing, rhonchi or rales.   Abdominal:      General: Bowel sounds are normal. There is no distension.      Palpations: Abdomen is soft. There is no mass.      Tenderness: There is no abdominal tenderness.   Musculoskeletal:         General: Normal range of motion.      Cervical back: Normal range of motion and neck supple. No muscular tenderness.      Right lower leg: No edema.      Left lower leg: No edema.   Skin:     General: Skin is warm.      Findings: No rash.   Neurological:      General: No focal deficit present.      Mental Status: He is alert and oriented to person, place, and time.      Motor: No weakness.      Coordination: Coordination normal.   Psychiatric:         Mood and Affect: Mood normal.         Behavior: Behavior normal.

## 2024-01-25 NOTE — PATIENT INSTRUCTIONS
Patient was advised to continue present medications. discussed with the patient medications and laboratory data in detail.  Follow-up with me in 6 months or as advised.  If any blood test was ordered please do 1 week prior to next appointment unless advise to get earlier.  If you have any questions please call the office [phone]

## 2024-02-01 ENCOUNTER — NURSE TRIAGE (OUTPATIENT)
Age: 44
End: 2024-02-01

## 2024-02-01 DIAGNOSIS — K21.9 GASTROESOPHAGEAL REFLUX DISEASE, UNSPECIFIED WHETHER ESOPHAGITIS PRESENT: Primary | ICD-10-CM

## 2024-02-01 RX ORDER — PANTOPRAZOLE SODIUM 40 MG/1
40 TABLET, DELAYED RELEASE ORAL DAILY
Qty: 90 TABLET | Refills: 0 | Status: SHIPPED | OUTPATIENT
Start: 2024-02-01

## 2024-02-01 NOTE — TELEPHONE ENCOUNTER
"Pt calling. He is c/o a sore throat x 4 days.  He states that he thinks it's acid reflux.  This is the same symptom he had a few years ago when he was diagnosed with acid reflux. Pt was not taking anything preventatively for it, but since his symptoms are reoccurring, he started taking his brother's pantoprazole 40mg and it seems to be helping. Diet reviewed.  Pt is not eating any foods that would exacerbate reflux.  Pt wants Dr. Jackson to be aware and see if he wants him to do anything else.  He also needs an rx for GERD medication.  Pt has been prescribed Pepcid in the past.     Reason for Disposition   Sore throat    Answer Assessment - Initial Assessment Questions  1. ONSET: \"When did the throat start hurting?\" (Hours or days ago)       About 4 days ago  2. SEVERITY: \"How bad is the sore throat?\" (Scale 1-10; mild, moderate or severe)    - MILD (1-3):  doesn't interfere with eating or normal activities    - MODERATE (4-7): interferes with eating some solids and normal activities    - SEVERE (8-10):  excruciating pain, interferes with most normal activities    - SEVERE DYSPHAGIA: can't swallow liquids, drooling      no  3. STREP EXPOSURE: \"Has there been any exposure to strep within the past week?\" If Yes, ask: \"What type of contact occurred?\"       no  4.  VIRAL SYMPTOMS: \"Are there any symptoms of a cold, such as a runny nose, cough, hoarse voice or red eyes?\"       no  5. FEVER: \"Do you have a fever?\" If Yes, ask: \"What is your temperature, how was it measured, and when did it start?\"      no  6. PUS ON THE TONSILS: \"Is there pus on the tonsils in the back of your throat?\"      no  7. OTHER SYMPTOMS: \"Do you have any other symptoms?\" (e.g., difficulty breathing, headache, rash)      Pantoprazole 40mg, some acid reflux   8. PREGNANCY: \"Is there any chance you are pregnant?\" \"When was your last menstrual period?\"      no    Protocols used: Sore Throat-ADULT-[place]    "

## 2024-02-01 NOTE — TELEPHONE ENCOUNTER
Regarding: acid reflux  ----- Message from Danielle Walden sent at 2/1/2024 10:03 AM EST -----  Dry throat and feels like he is having issues with acid reflux. States that this has happened in the past. He is going on day 4. Pleas review options and contact patient.

## 2024-03-15 DIAGNOSIS — E78.00 HYPERCHOLESTEROLEMIA: ICD-10-CM

## 2024-03-15 RX ORDER — SIMVASTATIN 10 MG
10 TABLET ORAL
Qty: 90 TABLET | Refills: 1 | Status: SHIPPED | OUTPATIENT
Start: 2024-03-15

## 2024-04-28 DIAGNOSIS — K21.9 GASTROESOPHAGEAL REFLUX DISEASE, UNSPECIFIED WHETHER ESOPHAGITIS PRESENT: ICD-10-CM

## 2024-04-28 RX ORDER — PANTOPRAZOLE SODIUM 40 MG/1
40 TABLET, DELAYED RELEASE ORAL DAILY
Qty: 90 TABLET | Refills: 0 | Status: SHIPPED | OUTPATIENT
Start: 2024-04-28

## 2024-07-18 LAB
ALBUMIN SERPL-MCNC: 4.3 G/DL (ref 3.6–5.1)
ALBUMIN/GLOB SERPL: 2 (CALC) (ref 1–2.5)
ALP SERPL-CCNC: 65 U/L (ref 36–130)
ALT SERPL-CCNC: 11 U/L (ref 9–46)
AST SERPL-CCNC: 14 U/L (ref 10–40)
BASOPHILS # BLD AUTO: 30 CELLS/UL (ref 0–200)
BASOPHILS NFR BLD AUTO: 0.8 %
BILIRUB SERPL-MCNC: 0.3 MG/DL (ref 0.2–1.2)
BUN SERPL-MCNC: 20 MG/DL (ref 7–25)
BUN/CREAT SERPL: ABNORMAL (CALC) (ref 6–22)
CALCIUM SERPL-MCNC: 9.3 MG/DL (ref 8.6–10.3)
CHLORIDE SERPL-SCNC: 105 MMOL/L (ref 98–110)
CHOLEST SERPL-MCNC: 178 MG/DL
CHOLEST/HDLC SERPL: 2.8 (CALC)
CO2 SERPL-SCNC: 27 MMOL/L (ref 20–32)
CREAT SERPL-MCNC: 1.21 MG/DL (ref 0.6–1.29)
EOSINOPHIL # BLD AUTO: 91 CELLS/UL (ref 15–500)
EOSINOPHIL NFR BLD AUTO: 2.4 %
ERYTHROCYTE [DISTWIDTH] IN BLOOD BY AUTOMATED COUNT: 12.8 % (ref 11–15)
FERRITIN SERPL-MCNC: 31 NG/ML (ref 38–380)
GFR/BSA.PRED SERPLBLD CYS-BASED-ARV: 75 ML/MIN/1.73M2
GLOBULIN SER CALC-MCNC: 2.1 G/DL (CALC) (ref 1.9–3.7)
GLUCOSE SERPL-MCNC: 102 MG/DL (ref 65–99)
HBA1C MFR BLD: 5.8 % OF TOTAL HGB
HCT VFR BLD AUTO: 44.8 % (ref 38.5–50)
HDLC SERPL-MCNC: 63 MG/DL
HGB BLD-MCNC: 15.2 G/DL (ref 13.2–17.1)
LDLC SERPL CALC-MCNC: 102 MG/DL (CALC)
LYMPHOCYTES # BLD AUTO: 1269 CELLS/UL (ref 850–3900)
LYMPHOCYTES NFR BLD AUTO: 33.4 %
MCH RBC QN AUTO: 28.9 PG (ref 27–33)
MCHC RBC AUTO-ENTMCNC: 33.9 G/DL (ref 32–36)
MCV RBC AUTO: 85.2 FL (ref 80–100)
MONOCYTES # BLD AUTO: 384 CELLS/UL (ref 200–950)
MONOCYTES NFR BLD AUTO: 10.1 %
NEUTROPHILS # BLD AUTO: 2025 CELLS/UL (ref 1500–7800)
NEUTROPHILS NFR BLD AUTO: 53.3 %
NONHDLC SERPL-MCNC: 115 MG/DL (CALC)
PLATELET # BLD AUTO: 222 THOUSAND/UL (ref 140–400)
PMV BLD REES-ECKER: 9.3 FL (ref 7.5–12.5)
POTASSIUM SERPL-SCNC: 4.3 MMOL/L (ref 3.5–5.3)
PROT SERPL-MCNC: 6.4 G/DL (ref 6.1–8.1)
RBC # BLD AUTO: 5.26 MILLION/UL (ref 4.2–5.8)
SODIUM SERPL-SCNC: 140 MMOL/L (ref 135–146)
TRIGL SERPL-MCNC: 50 MG/DL
WBC # BLD AUTO: 3.8 THOUSAND/UL (ref 3.8–10.8)

## 2024-07-25 ENCOUNTER — OFFICE VISIT (OUTPATIENT)
Dept: INTERNAL MEDICINE CLINIC | Facility: CLINIC | Age: 44
End: 2024-07-25
Payer: COMMERCIAL

## 2024-07-25 VITALS
RESPIRATION RATE: 18 BRPM | HEART RATE: 87 BPM | HEIGHT: 67 IN | BODY MASS INDEX: 21.35 KG/M2 | TEMPERATURE: 97.7 F | SYSTOLIC BLOOD PRESSURE: 104 MMHG | DIASTOLIC BLOOD PRESSURE: 70 MMHG | OXYGEN SATURATION: 97 % | WEIGHT: 136 LBS

## 2024-07-25 DIAGNOSIS — R35.1 NOCTURIA: ICD-10-CM

## 2024-07-25 DIAGNOSIS — R73.09 ELEVATED HEMOGLOBIN A1C: ICD-10-CM

## 2024-07-25 DIAGNOSIS — D72.818 OTHER DECREASED WHITE BLOOD CELL (WBC) COUNT: ICD-10-CM

## 2024-07-25 DIAGNOSIS — K21.9 GASTROESOPHAGEAL REFLUX DISEASE WITHOUT ESOPHAGITIS: ICD-10-CM

## 2024-07-25 DIAGNOSIS — E61.1 IRON DEFICIENCY: ICD-10-CM

## 2024-07-25 DIAGNOSIS — Z79.899 HIGH RISK MEDICATION USE: ICD-10-CM

## 2024-07-25 DIAGNOSIS — Z00.00 ANNUAL PHYSICAL EXAM: Primary | ICD-10-CM

## 2024-07-25 DIAGNOSIS — E55.9 VITAMIN D DEFICIENCY: ICD-10-CM

## 2024-07-25 DIAGNOSIS — N52.8 OTHER MALE ERECTILE DYSFUNCTION: ICD-10-CM

## 2024-07-25 DIAGNOSIS — R39.15 URINARY URGENCY: ICD-10-CM

## 2024-07-25 DIAGNOSIS — E78.00 HYPERCHOLESTEROLEMIA: ICD-10-CM

## 2024-07-25 PROCEDURE — 99213 OFFICE O/P EST LOW 20 MIN: CPT | Performed by: INTERNAL MEDICINE

## 2024-07-25 PROCEDURE — 99396 PREV VISIT EST AGE 40-64: CPT | Performed by: INTERNAL MEDICINE

## 2024-07-25 RX ORDER — MULTIVIT-MIN/IRON/FOLIC ACID/K 18-600-40
1 CAPSULE ORAL
COMMUNITY

## 2024-07-25 RX ORDER — PANTOPRAZOLE SODIUM 40 MG/1
40 TABLET, DELAYED RELEASE ORAL AS NEEDED
COMMUNITY
End: 2024-07-28

## 2024-07-25 NOTE — PROGRESS NOTES
Adult Annual Physical  Name: Reggie Murray      : 1980      MRN: [id number]  Encounter Provider: Naldo Jackson MD  Encounter Date: 2024   Encounter department: [facility] INTERNAL MEDICINE    Assessment & Plan   1. Annual physical exam  2. Gastroesophageal reflux disease without esophagitis  3. Other decreased white blood cell (WBC) count  -     CBC and differential; Future  -     CBC and differential  4. Iron deficiency  -     CBC and differential; Future  -     CBC and differential  5. Hypercholesterolemia  -     Comprehensive metabolic panel; Future  -     Lipid panel; Future  -     Comprehensive metabolic panel  -     Lipid panel  6. Vitamin D deficiency  -     Comprehensive metabolic panel; Future  -     Vitamin D 25 hydroxy; Future  -     Comprehensive metabolic panel  -     Vitamin D 25 hydroxy  7. BMI 21.0-21.9, adult  8. Urinary urgency  -     UA (URINE) with reflex to Scope; Future  -     UA (URINE) with reflex to Scope  -     Ambulatory Referral to Urology; Future  9. Nocturia  -     UA (URINE) with reflex to Scope; Future  -     UA (URINE) with reflex to Scope  -     Ambulatory Referral to Urology; Future  10. Other male erectile dysfunction  -     Testosterone, free, total; Future  -     Testosterone, free, total  -     Ambulatory Referral to Urology; Future  11. Elevated hemoglobin A1c  -     Comprehensive metabolic panel; Future  -     Hemoglobin A1C; Future  -     Comprehensive metabolic panel  12. High risk medication use  -     Comprehensive metabolic panel; Future  -     Comprehensive metabolic panel  Discussion/summary/plan:    For GE reflux he takes pantoprazole now only as needed.  Had EGD and GI evaluation.  Tried famotidine was not effective.  Has been watching diet and taking reflux precautions.  Leukopenia resolved WBC 3.8 with normal hemoglobin and platelet counts.  Had seen hematology in the past.  Will observe and follow CBC.  Iron  deficient anemia he takes iron therapy.  Had EGD colonoscopy.  Hemoglobin 15.2 and patient improved from 8-31.  Continue iron therapy.  Hypercalcemia resolved.  Cholesterol 178, triglyceride 50, HDL 63, LDL increased from 93-1 02 patient stated he went on vacation and did not watch diet very closely although he has been taking simvastatin regularly.  Advised for low-cholesterol low-carb diet.  Will follow lipid panel.  Patient counseled urinary urgency and nocturia.  He has to wake up at night 1-2 times per night.  Also, no erectile dysfunction.  Will check urine analysis and testosterone level.  Will refer to urologist.  Blood sugar 102 and hemoglobin A1c 5.8 consider with the prediabetes patient said he has been not watching diet and lately eating more sweets.  Advised to decrease sugar and carbs intake and will follow hemoglobin A1c and fasting blood sugar.  Vitamin D deficient is on vitamin D supplement vitamin D level 38 advised to continue same dose and will follow yearly vitamin D level.    Immunizations and preventive care screenings were discussed with patient today. Appropriate education was printed on patient's after visit summary.    Discussed risks and benefits of prostate cancer screening. We discussed the controversial history of PSA screening for prostate cancer in the [place] as well as the risk of over detection and over treatment of prostate cancer by way of PSA screening.  The patient understands that PSA blood testing is an imperfect way to screen for prostate cancer and that elevated PSA levels in the blood may also be caused by infection, inflammation, prostatic trauma or manipulation, urological procedures, or by benign prostatic enlargement.    The role of the digital rectal examination in prostate cancer screening was also discussed and I discussed with him that there is large interobserver variability in the findings of digital rectal examination.    Counseling:  Alcohol/drug use:  discussed moderation in alcohol intake, the recommendations for healthy alcohol use, and avoidance of illicit drug use.  Dental Health: discussed importance of regular tooth brushing, flossing, and dental visits.  Injury prevention: discussed safety/seat belts, safety helmets, smoke detectors, carbon dioxide detectors, and smoking near bedding or upholstery.  Sexual health: discussed sexually transmitted diseases, partner selection, use of condoms, avoidance of unintended pregnancy, and contraceptive alternatives.  Exercise: the importance of regular exercise/physical activity was discussed. Recommend exercise 3-5 times per week for at least 30 minutes.          History of Present Illness     Adult Annual Physical  Review of Systems   Constitutional:  Negative for chills, fatigue and fever.   HENT:  Negative for congestion, ear pain, hearing loss, nosebleeds, sinus pain, sore throat and trouble swallowing.    Eyes:  Negative for discharge, redness and visual disturbance.   Respiratory:  Negative for cough, chest tightness and shortness of breath.    Cardiovascular:  Negative for chest pain and palpitations.   Gastrointestinal:  Negative for abdominal pain, blood in stool, constipation, diarrhea, nausea and vomiting.   Genitourinary:  Positive for urgency. Negative for dysuria, flank pain, frequency and hematuria.        Complain of erectile dysfunction   Musculoskeletal:  Negative for arthralgias, myalgias and neck pain.   Skin:  Negative for color change and rash.   Neurological:  Negative for dizziness, speech difficulty, weakness and headaches.   Hematological:  Does not bruise/bleed easily.   Psychiatric/Behavioral:  Negative for agitation and behavioral problems.      Pertinent Medical History       Past Medical History   Past Medical History:   Diagnosis Date   • Annual physical exam 07/25/2024   • BMI 21.0-21.9, adult 03/02/2021   • BMI 21.0-21.9, adult 02/08/2022   • Chest pain 09/21/2021   • Chest pain,  atypical    • Elevated hemoglobin A1c 07/25/2024   • Esophageal reflux    • GE reflux 02/28/2021   • GERD (gastroesophageal reflux disease)    • Hypercholesterolemia 02/28/2021   • Hypercholesterolemia 02/08/2022   • Iron deficiency 02/28/2021   • Iron deficiency 02/08/2022   • Leukopenia 02/28/2021   • Leukopenia 02/08/2022   • Lipoma of upper extremity 11/08/2022   • Lump of skin 03/02/2021   • Lumps on the skin 09/21/2021   • Muscle cramps 02/08/2022   • Nocturia    • Nocturia 07/25/2024   • Other male erectile dysfunction 07/25/2024   • Pain in both lower legs 09/21/2021   • Urinary urgency    • Urinary urgency 07/25/2024   • Vitamin D deficiency 08/09/2022     Past Surgical History:   Procedure Laterality Date   • COLONOSCOPY     • NO PAST SURGERIES     • UPPER GASTROINTESTINAL ENDOSCOPY       Family History   Problem Relation Age of Onset   • Stroke Father    • Coronary artery disease Father    • Colon cancer Maternal Grandfather    • Colon cancer Maternal Uncle      Current Outpatient Medications on File Prior to Visit   Medication Sig Dispense Refill   • ascorbic acid (VITAMIN C) 500 mg tablet Take 500 mg by mouth daily     • Ferrous Sulfate Dried (High Potency Iron) 65 MG TABS Take by mouth in the morning     • pantoprazole (PROTONIX) 40 mg tablet Take 40 mg by mouth if needed     • simvastatin (ZOCOR) 10 mg tablet TAKE 1 TABLET BY MOUTH DAILY AT BEDTIME 90 tablet 1   • Vitamin D, Cholecalciferol, 25 MCG (1000 UT) TABS Take 1 tablet by mouth daily in the early morning     • [DISCONTINUED] famotidine (PEPCID) 40 MG tablet Take 1 tablet (40 mg total) by mouth daily at bedtime as needed for heartburn 30 tablet 1   • [DISCONTINUED] pantoprazole (PROTONIX) 40 mg tablet TAKE 1 TABLET BY MOUTH EVERY DAY 90 tablet 0   • [DISCONTINUED] VITAMIN D PO Take by mouth Patient unsure of dose       No current facility-administered medications on file prior to visit.   No Known Allergies   Current Outpatient Medications on  "File Prior to Visit   Medication Sig Dispense Refill   • ascorbic acid (VITAMIN C) 500 mg tablet Take 500 mg by mouth daily     • Ferrous Sulfate Dried (High Potency Iron) 65 MG TABS Take by mouth in the morning     • pantoprazole (PROTONIX) 40 mg tablet Take 40 mg by mouth if needed     • simvastatin (ZOCOR) 10 mg tablet TAKE 1 TABLET BY MOUTH DAILY AT BEDTIME 90 tablet 1   • Vitamin D, Cholecalciferol, 25 MCG (1000 UT) TABS Take 1 tablet by mouth daily in the early morning     • [DISCONTINUED] famotidine (PEPCID) 40 MG tablet Take 1 tablet (40 mg total) by mouth daily at bedtime as needed for heartburn 30 tablet 1   • [DISCONTINUED] pantoprazole (PROTONIX) 40 mg tablet TAKE 1 TABLET BY MOUTH EVERY DAY 90 tablet 0   • [DISCONTINUED] VITAMIN D PO Take by mouth Patient unsure of dose       No current facility-administered medications on file prior to visit.      Social History     Tobacco Use   • Smoking status: Never   • Smokeless tobacco: Never   Vaping Use   • Vaping status: Never Used   Substance and Sexual Activity   • Alcohol use: No   • Drug use: No   • Sexual activity: Yes     Partners: Female     Birth control/protection: Condom Male       Objective     /70 (BP Location: Left arm, Patient Position: Sitting, Cuff Size: Standard)   Pulse 87   Temp 97.7 °F (36.5 °C) (Temporal)   Resp 18   Ht 5' 7\" (1.702 m)   Wt 61.7 kg (136 lb)   SpO2 97%   BMI 21.30 kg/m²     Physical Exam  Vitals and nursing note reviewed.   Constitutional:       General: He is not in acute distress.     Appearance: Normal appearance. He is well-developed.   HENT:      Head: Normocephalic and atraumatic.      Right Ear: Tympanic membrane, ear canal and external ear normal. There is no impacted cerumen.      Left Ear: Tympanic membrane and external ear normal. There is no impacted cerumen.      Nose: Nose normal.      Mouth/Throat:      Mouth: Mucous membranes are moist.      Pharynx: Oropharynx is clear.   Eyes:      General: No " scleral icterus.        Right eye: No discharge.         Left eye: No discharge.      Extraocular Movements: Extraocular movements intact.      Conjunctiva/sclera: Conjunctivae normal.      Pupils: Pupils are equal, round, and reactive to light.   Cardiovascular:      Rate and Rhythm: Normal rate and regular rhythm.      Pulses: Normal pulses.      Heart sounds: Normal heart sounds. No murmur heard.  Pulmonary:      Effort: Pulmonary effort is normal. No respiratory distress.      Breath sounds: Normal breath sounds. No wheezing, rhonchi or rales.   Abdominal:      General: Bowel sounds are normal. There is no distension.      Palpations: Abdomen is soft. There is no mass.      Tenderness: There is no abdominal tenderness.   Musculoskeletal:         General: No swelling. Normal range of motion.      Cervical back: Normal range of motion and neck supple.      Right lower leg: No edema.      Left lower leg: No edema.   Skin:     General: Skin is warm and dry.      Capillary Refill: Capillary refill takes less than 2 seconds.      Findings: No rash.   Neurological:      General: No focal deficit present.      Mental Status: He is alert and oriented to person, place, and time.      Motor: No weakness.      Coordination: Coordination normal.   Psychiatric:         Mood and Affect: Mood normal.         Behavior: Behavior normal.

## 2024-07-26 DIAGNOSIS — K21.9 GASTRO-ESOPHAGEAL REFLUX DISEASE WITHOUT ESOPHAGITIS: ICD-10-CM

## 2024-07-26 LAB
APPEARANCE UR: CLEAR
BILIRUB UR QL STRIP: NEGATIVE
COLOR UR: YELLOW
GLUCOSE UR QL STRIP: NEGATIVE
HGB UR QL STRIP: NEGATIVE
KETONES UR QL STRIP: NEGATIVE
LEUKOCYTE ESTERASE UR QL STRIP: NEGATIVE
NITRITE UR QL STRIP: NEGATIVE
PH UR STRIP: 5.5 [PH] (ref 5–8)
PROT UR QL STRIP: NEGATIVE
SP GR UR STRIP: 1.02 (ref 1–1.03)

## 2024-07-26 NOTE — TELEPHONE ENCOUNTER
Refill must be reviewed and completed by the office or provider. The refill is unable to be approved or denied by the medication management team.    Historic Provider please review.

## 2024-07-28 RX ORDER — PANTOPRAZOLE SODIUM 40 MG/1
40 TABLET, DELAYED RELEASE ORAL DAILY
Qty: 90 TABLET | Refills: 1 | Status: SHIPPED | OUTPATIENT
Start: 2024-07-28

## 2024-07-30 LAB
APPEARANCE UR: CLEAR
BILIRUB UR QL STRIP: NEGATIVE
COLOR UR: YELLOW
GLUCOSE UR QL STRIP: NEGATIVE
HGB UR QL STRIP: NEGATIVE
KETONES UR QL STRIP: NEGATIVE
LEUKOCYTE ESTERASE UR QL STRIP: NEGATIVE
NITRITE UR QL STRIP: NEGATIVE
PH UR STRIP: 5.5 [PH] (ref 5–8)
PROT UR QL STRIP: NEGATIVE
SP GR UR STRIP: 1.02 (ref 1–1.03)
TESTOST FREE SERPL-MCNC: 54.7 PG/ML (ref 35–155)
TESTOST SERPL-MCNC: 291 NG/DL (ref 250–1100)

## 2024-09-17 ENCOUNTER — CONSULT (OUTPATIENT)
Dept: UROLOGY | Facility: CLINIC | Age: 44
End: 2024-09-17
Payer: COMMERCIAL

## 2024-09-17 VITALS
SYSTOLIC BLOOD PRESSURE: 118 MMHG | WEIGHT: 138.6 LBS | DIASTOLIC BLOOD PRESSURE: 92 MMHG | HEIGHT: 67 IN | BODY MASS INDEX: 21.75 KG/M2 | HEART RATE: 65 BPM | OXYGEN SATURATION: 98 %

## 2024-09-17 DIAGNOSIS — E78.00 HYPERCHOLESTEROLEMIA: ICD-10-CM

## 2024-09-17 DIAGNOSIS — N52.8 OTHER MALE ERECTILE DYSFUNCTION: ICD-10-CM

## 2024-09-17 DIAGNOSIS — R35.1 NOCTURIA: ICD-10-CM

## 2024-09-17 DIAGNOSIS — R39.15 URINARY URGENCY: ICD-10-CM

## 2024-09-17 LAB
POST-VOID RESIDUAL VOLUME, ML POC: 28 ML
SL AMB  POCT GLUCOSE, UA: NORMAL
SL AMB LEUKOCYTE ESTERASE,UA: NORMAL
SL AMB POCT BILIRUBIN,UA: NORMAL
SL AMB POCT BLOOD,UA: NORMAL
SL AMB POCT CLARITY,UA: CLEAR
SL AMB POCT COLOR,UA: YELLOW
SL AMB POCT KETONES,UA: NORMAL
SL AMB POCT NITRITE,UA: NORMAL
SL AMB POCT PH,UA: 6
SL AMB POCT SPECIFIC GRAVITY,UA: 1.02
SL AMB POCT URINE PROTEIN: NORMAL
SL AMB POCT UROBILINOGEN: 0.2

## 2024-09-17 PROCEDURE — 81002 URINALYSIS NONAUTO W/O SCOPE: CPT | Performed by: PHYSICIAN ASSISTANT

## 2024-09-17 PROCEDURE — 99203 OFFICE O/P NEW LOW 30 MIN: CPT | Performed by: PHYSICIAN ASSISTANT

## 2024-09-17 PROCEDURE — 51798 US URINE CAPACITY MEASURE: CPT | Performed by: PHYSICIAN ASSISTANT

## 2024-09-17 NOTE — PROGRESS NOTES
UROLOGY CONSULTATION NOTE     Patient Identifiers: Reggie Murray (MRN: [id number])  Service Requesting Consultation:Naldo Jackson MD     Service Providing Consultation:  Urology, Javier Sinha PA-C  Consults  Date of Service: 9/17/2024    Reason for Consultation: Urinary frequency    History of Present Illness:     Reggie Murray is a 44 y.o. male who saw Dr. Burnett a few years ago with similar complaint.  He had urinary frequency and urgency.  He has some nocturia.  No incontinence.  Those symptoms seem to self resolve back in 2018.  He is rereferred for pretty much the same complaint with urinary frequency and urgency.  His urine is clear.  PVR is 28 mL.  He also reports erections not being as strong as previous.  He is on iron and Zocor.  Testosterone around 291.  Denies any family history of prostate bladder or kidney diseases.  Past Medical, Past Surgical History:     Past Medical History:   Diagnosis Date    Annual physical exam 07/25/2024    BMI 21.0-21.9, adult 03/02/2021    BMI 21.0-21.9, adult 02/08/2022    Chest pain 09/21/2021    Chest pain, atypical     Elevated hemoglobin A1c 07/25/2024    Esophageal reflux     GE reflux 02/28/2021    GERD (gastroesophageal reflux disease)     Hypercholesterolemia 02/28/2021    Hypercholesterolemia 02/08/2022    Iron deficiency 02/28/2021    Iron deficiency 02/08/2022    Leukopenia 02/28/2021    Leukopenia 02/08/2022    Lipoma of upper extremity 11/08/2022    Lump of skin 03/02/2021    Lumps on the skin 09/21/2021    Muscle cramps 02/08/2022    Nocturia     Nocturia 07/25/2024    Other male erectile dysfunction 07/25/2024    Pain in both lower legs 09/21/2021    Urinary urgency     Urinary urgency 07/25/2024    Vitamin D deficiency 08/09/2022   :    Past Surgical History:   Procedure Laterality Date    COLONOSCOPY      NO PAST SURGERIES      UPPER GASTROINTESTINAL ENDOSCOPY     :    Medications, Allergies:     Current  "Outpatient Medications:     ascorbic acid (VITAMIN C) 500 mg tablet, Take 500 mg by mouth daily, Disp: , Rfl:     Ferrous Sulfate Dried (High Potency Iron) 65 MG TABS, Take by mouth in the morning, Disp: , Rfl:     pantoprazole (PROTONIX) 40 mg tablet, TAKE 1 TABLET BY MOUTH EVERY DAY, Disp: 90 tablet, Rfl: 1    simvastatin (ZOCOR) 10 mg tablet, TAKE 1 TABLET BY MOUTH DAILY AT BEDTIME, Disp: 90 tablet, Rfl: 1    Vitamin D, Cholecalciferol, 25 MCG (1000 UT) TABS, Take 1 tablet by mouth daily in the early morning, Disp: , Rfl:     Allergies:  No Known Allergies:    Social and Family History:   Social History:   Social History     Tobacco Use    Smoking status: Never    Smokeless tobacco: Never   Vaping Use    Vaping status: Never Used   Substance Use Topics    Alcohol use: No    Drug use: No   .    Social History     Tobacco Use   Smoking Status Never   Smokeless Tobacco Never       Family History:  Family History   Problem Relation Age of Onset    Stroke Father     Coronary artery disease Father     Colon cancer Maternal Grandfather     Colon cancer Maternal Uncle    :     Review of Systems:     General: Fever, chills, or night sweats: negative  Cardiac: Negative for chest pain.    Pulmonary: Negative for shortness of breath.  Gastrointestinal: Abdominal pain negative.  Nausea, vomiting, or diarrhea negative,  Genitourinary: See HPI above.  Patient does not have hematuria.  All other systems queried were negative.    Physical Exam:   General: Patient is pleasant and in NAD. Awake and alert  /92 (BP Location: Left arm, Patient Position: Sitting, Cuff Size: Standard)   Pulse 65   Ht 5' 7\" (1.702 m)   Wt 62.9 kg (138 lb 9.6 oz)   SpO2 98%   BMI 21.71 kg/m²   HEENT:  Conjunctiva are clear  Constitutional:  pleasant and cooperative     no apparent distress  Cardiac: Peripheral edema: negative  Pulmonary: Non-labored breathing  Abdomen: Soft, non-tender, non-distended.  No surgical scars.  No masses, tenderness, " hernias noted.    Genitourinary: Negative CVA tenderness, negative suprapubic tenderness.  Extremities:  Moves all extremities  Neurological:CNII-XII intact. No numbness or tingling. Essentially non focal neurologic exam  Psychiatric:mood affect and behavior normal    (Male): Penis uncircumcised, phallus normal, meatus patent.  Testicles descended into scrotum bilaterally without masses nor tenderness.  No inguinal hernias bilaterally.  ALISON: Prostate is not enlarged at 25 grams. The prostate is not boggy. The prostate is not tender.  No nodules noted.    Labs:     Lab Results   Component Value Date    HGB 15.2 07/18/2024    HCT 44.8 07/18/2024    WBC 3.8 07/18/2024     07/18/2024   ]    Lab Results   Component Value Date    K 4.3 07/18/2024     07/18/2024    CO2 27 07/18/2024    BUN 20 07/18/2024    CREATININE 1.21 07/18/2024    CALCIUM 9.3 07/18/2024   ]    Imaging:   I personally reviewed the images and report of the following studies, and reviewed them with the patient:  None      ASSESSMENT:     #1.  Voiding dysfunction  #2.  ED    PLAN:   -I reviewed the findings and the options of management the patient  -We talked about his low testosterone as well as his urinary issues  -Both may have a stress component contributing to the problem.  -I offered him daily Cialis as a possible solution for both problems  -We talked about low testosterone possible symptoms and possible treatment  -He is not interested in taking any medications  -Will observe his situation and call if he wishes to pursue any other treatment or if his problems get worse  -May follow-up with urology on an as-needed basis  -PSA testing can be done by his primary physician going forward      Thank you for allowing me to participate in this patients’ care.  Please do not hesitate to call with any additional questions.  Javier Sinha PA-C

## 2024-09-18 RX ORDER — SIMVASTATIN 10 MG
10 TABLET ORAL
Qty: 90 TABLET | Refills: 1 | Status: SHIPPED | OUTPATIENT
Start: 2024-09-18

## 2024-09-23 ENCOUNTER — NURSE TRIAGE (OUTPATIENT)
Age: 44
End: 2024-09-23

## 2024-09-23 NOTE — TELEPHONE ENCOUNTER
"Pt calling.  Since 09/19/2024 he is having watery diarrhea.  He has about 3-4 episodes a day.  He is hydrating with Gatorade.  He denies any s/s of dehydration. He has abdominal cramps that he rates at 5/10 and that are relieved by a bowel movement.  He also has a dry cough.  Care advice given.  Same day appt offered, pt cannot come until tomorrow.  Appt for tomorrow at 0820 scheduled.       Reason for Disposition   MODERATE diarrhea (e.g., 4-6 times / day more than normal) and present > 48 hours (2 days)    Answer Assessment - Initial Assessment Questions  1. DIARRHEA SEVERITY: \"How bad is the diarrhea?\" \"How many extra stools have you had in the past 24 hours than normal?\"     - NO DIARRHEA (SCALE 0)    - MILD (SCALE 1-3): Few loose or mushy BMs; increase of 1-3 stools over normal daily number of stools; mild increase in ostomy output.    -  MODERATE (SCALE 4-7): Increase of 4-6 stools daily over normal; moderate increase in ostomy output.  * SEVERE (SCALE 8-10; OR 'WORST POSSIBLE'): Increase of 7 or more stools daily over normal; moderate increase in ostomy output; incontinence.      3-4 times a day   2. ONSET: \"When did the diarrhea begin?\"       09/19/2024  3. BM CONSISTENCY: \"How loose or watery is the diarrhea?\"       Watery   4. VOMITING: \"Are you also vomiting?\" If Yes, ask: \"How many times in the past 24 hours?\"         5. ABDOMINAL PAIN: \"Are you having any abdominal pain?\" If Yes, ask: \"What does it feel like?\" (e.g., crampy, dull, intermittent, constant)       Abdominal cramping   6. ABDOMINAL PAIN SEVERITY: If present, ask: \"How bad is the pain?\"  (e.g., Scale 1-10; mild, moderate, or severe)    - MILD (1-3): doesn't interfere with normal activities, abdomen soft and not tender to touch     - MODERATE (4-7): interferes with normal activities or awakens from sleep, tender to touch     - SEVERE (8-10): excruciating pain, doubled over, unable to do any normal activities        5/10  7. ORAL INTAKE: If " "vomiting, \"Have you been able to drink liquids?\" \"How much fluids have you had in the past 24 hours?\"      yes  8. HYDRATION: \"Any signs of dehydration?\" (e.g., dry mouth [not just dry lips], too weak to stand, dizziness, new weight loss) \"When did you last urinate?\"      no  9. EXPOSURE: \"Have you traveled to a foreign country recently?\" \"Have you been exposed to anyone with diarrhea?\" \"Could you have eaten any food that was spoiled?\"      no  10. ANTIBIOTIC USE: \"Are you taking antibiotics now or have you taken antibiotics in the past 2 months?\"        no  11. OTHER SYMPTOMS: \"Do you have any other symptoms?\" (e.g., fever, blood in stool)        Chills 09/19/2024  12. PREGNANCY: \"Is there any chance you are pregnant?\" \"When was your last menstrual period?\"        no    Protocols used: Diarrhea-ADULT-[place]    "

## 2024-09-23 NOTE — TELEPHONE ENCOUNTER
Regarding: stomach pain and diarrhea  ----- Message from Carolin BLOUNT sent at 9/23/2024  8:46 AM EDT -----  Patient called stating he has had stomach pain/cramps and diarrhea for the last 4 days.  Offered SAME day appt- pt could not do today and booked for tomorrow first thing.  Please triage.

## 2024-09-24 ENCOUNTER — OFFICE VISIT (OUTPATIENT)
Dept: INTERNAL MEDICINE CLINIC | Facility: CLINIC | Age: 44
End: 2024-09-24
Payer: COMMERCIAL

## 2024-09-24 VITALS
WEIGHT: 133 LBS | OXYGEN SATURATION: 99 % | DIASTOLIC BLOOD PRESSURE: 76 MMHG | RESPIRATION RATE: 16 BRPM | HEIGHT: 67 IN | HEART RATE: 82 BPM | TEMPERATURE: 97.8 F | SYSTOLIC BLOOD PRESSURE: 116 MMHG | BODY MASS INDEX: 20.88 KG/M2

## 2024-09-24 DIAGNOSIS — R05.1 ACUTE COUGH: Primary | ICD-10-CM

## 2024-09-24 DIAGNOSIS — R19.7 DIARRHEA OF PRESUMED INFECTIOUS ORIGIN: ICD-10-CM

## 2024-09-24 DIAGNOSIS — J02.9 SORE THROAT: ICD-10-CM

## 2024-09-24 LAB
SARS-COV-2 AG UPPER RESP QL IA: NEGATIVE
VALID CONTROL: NORMAL

## 2024-09-24 PROCEDURE — 87811 SARS-COV-2 COVID19 W/OPTIC: CPT | Performed by: INTERNAL MEDICINE

## 2024-09-24 PROCEDURE — 99213 OFFICE O/P EST LOW 20 MIN: CPT | Performed by: INTERNAL MEDICINE

## 2024-09-24 NOTE — PROGRESS NOTES
Assessment/Plan:    1. Acute cough  -     POCT Rapid Covid Ag  2. Sore throat  -     POCT Rapid Covid Ag  3. Diarrhea of presumed infectious origin  -     POCT Rapid Covid Ag     Discussion/summary/plan:    He developed cough, sore throat, diarrhea and chills since last Thursday.  Cough sore throat is improving.  No chills or fever.  Initially had a diarrhea like 5 to 6 loose BMs per day with abdominal cramp.No associated nausea vomiting.  Stool is brown in color no blood in the stool.  No recent travel.  Nobody sick at home.  COVID-19 test was done in the office today which was negative.  He started drinking black coffee since then frequency of loose stools better.  Now has a about 2-3 times per day.  Most likely viral syndrome.  Advised to avoid any dairy products okay to eat yogurt.  Frequent small meals for now.  start probiotic.  Advised to maintain hydration which he has been drinking with electrolytes.  Patient still call if not better or get worse.    Subjective: Cough, sore throat, diarrhea      Patient ID: Reggie Murray is a 44 y.o. male.    HPI  44-year-old male patient present, cough, sore throat, diarrhea.  See as above.    The following portions of the patient's history were reviewed and updated as appropriate:     Past Medical History:  He has a past medical history of Acute cough (09/24/2024), Annual physical exam (07/25/2024), BMI 21.0-21.9, adult (03/02/2021), BMI 21.0-21.9, adult (02/08/2022), Chest pain (09/21/2021), Chest pain, atypical, Diarrhea of presumed infectious origin (09/24/2024), Elevated hemoglobin A1c (07/25/2024), Esophageal reflux, GE reflux (02/28/2021), GERD (gastroesophageal reflux disease), Hypercholesterolemia (02/28/2021), Hypercholesterolemia (02/08/2022), Iron deficiency (02/28/2021), Iron deficiency (02/08/2022), Leukopenia (02/28/2021), Leukopenia (02/08/2022), Lipoma of upper extremity (11/08/2022), Lump of skin (03/02/2021), Lumps on the skin (09/21/2021), Muscle  cramps (02/08/2022), Nocturia, Nocturia (07/25/2024), Other male erectile dysfunction (07/25/2024), Pain in both lower legs (09/21/2021), Sore throat (09/24/2024), Urinary urgency, Urinary urgency (07/25/2024), and Vitamin D deficiency (08/09/2022).,  _______________________________________________________________________  Past Surgical History:   has a past surgical history that includes No past surgeries; Colonoscopy; and Upper gastrointestinal endoscopy.,  _______________________________________________________________________  Family History:  family history includes Colon cancer in his maternal grandfather and maternal uncle; Coronary artery disease in his father; Stroke in his father.,  _______________________________________________________________________  Social History:   reports that he has never smoked. He has never used smokeless tobacco. He reports that he does not drink alcohol and does not use drugs.,  _______________________________________________________________________  Allergies:  has No Known Allergies..  _______________________________________________________________________  Current Outpatient Medications   Medication Sig Dispense Refill    ascorbic acid (VITAMIN C) 500 mg tablet Take 500 mg by mouth daily      Ferrous Sulfate Dried (High Potency Iron) 65 MG TABS Take by mouth in the morning      pantoprazole (PROTONIX) 40 mg tablet TAKE 1 TABLET BY MOUTH EVERY DAY (Patient taking differently: Take 40 mg by mouth if needed) 90 tablet 1    simvastatin (ZOCOR) 10 mg tablet TAKE 1 TABLET BY MOUTH EVERYDAY AT BEDTIME 90 tablet 1    Vitamin D, Cholecalciferol, 25 MCG (1000 UT) TABS Take 1 tablet by mouth daily in the early morning       No current facility-administered medications for this visit.     _______________________________________________________________________  Review of Systems   Constitutional:  Negative for chills and fever.   HENT:  Positive for sore throat. Negative for congestion,  "ear pain, hearing loss, nosebleeds, sinus pain and trouble swallowing.    Eyes:  Negative for discharge, redness and visual disturbance.   Respiratory:  Positive for cough. Negative for chest tightness, shortness of breath and wheezing.    Cardiovascular:  Negative for chest pain and palpitations.   Gastrointestinal:  Positive for abdominal pain (Lower abdominal cramps) and diarrhea. Negative for blood in stool, constipation, nausea and vomiting.   Genitourinary:  Negative for dysuria, flank pain and hematuria.   Musculoskeletal:  Negative for arthralgias, myalgias and neck pain.   Skin:  Negative for color change and rash.   Neurological:  Negative for dizziness, speech difficulty, weakness and headaches.   Hematological:  Does not bruise/bleed easily.   Psychiatric/Behavioral:  Negative for agitation and behavioral problems.          Objective:  Vitals:    09/24/24 0824   BP: 116/76   BP Location: Left arm   Patient Position: Sitting   Cuff Size: Standard   Pulse: 82   Resp: 16   Temp: 97.8 °F (36.6 °C)   TempSrc: Temporal   SpO2: 99%   Weight: 60.3 kg (133 lb)   Height: 5' 7\" (1.702 m)     Body mass index is 20.83 kg/m².     Physical Exam  Vitals and nursing note reviewed.   Constitutional:       General: He is not in acute distress.     Appearance: Normal appearance.   HENT:      Head: Normocephalic and atraumatic.      Right Ear: Ear canal and external ear normal.      Left Ear: Ear canal and external ear normal.      Nose: Nose normal.      Mouth/Throat:      Mouth: Mucous membranes are moist.      Pharynx: Posterior oropharyngeal erythema present. No oropharyngeal exudate.   Eyes:      General: No scleral icterus.        Right eye: No discharge.         Left eye: No discharge.      Extraocular Movements: Extraocular movements intact.      Conjunctiva/sclera: Conjunctivae normal.   Cardiovascular:      Rate and Rhythm: Normal rate and regular rhythm.      Pulses: Normal pulses.      Heart sounds: Normal heart " sounds. No murmur heard.  Pulmonary:      Effort: Pulmonary effort is normal. No respiratory distress.      Breath sounds: Normal breath sounds. No wheezing, rhonchi or rales.   Abdominal:      General: Bowel sounds are normal. There is no distension.      Palpations: Abdomen is soft. There is no mass.      Tenderness: There is abdominal tenderness (Has a mild lower abdomen discomfort no rebound tenderness). There is no guarding or rebound.   Musculoskeletal:         General: Normal range of motion.      Cervical back: Normal range of motion and neck supple. No muscular tenderness.      Right lower leg: No edema.      Left lower leg: No edema.   Skin:     General: Skin is warm.      Findings: No rash.   Neurological:      General: No focal deficit present.      Mental Status: He is alert and oriented to person, place, and time.      Motor: No weakness.      Coordination: Coordination normal.   Psychiatric:         Mood and Affect: Mood normal.         Behavior: Behavior normal.

## 2024-09-24 NOTE — PATIENT INSTRUCTIONS
Patient was advised to continue present medications. discussed with the patient medications and laboratory data in detail.  Follow-up with me as advised.  If any blood test was ordered please do 1 week prior to next appointment unless advise to get earlier.  If you have any questions please call the office [phone]

## 2024-10-24 PROBLEM — R19.7 DIARRHEA OF PRESUMED INFECTIOUS ORIGIN: Status: RESOLVED | Noted: 2024-09-24 | Resolved: 2024-10-24

## 2024-10-24 PROBLEM — R05.1 ACUTE COUGH: Status: RESOLVED | Noted: 2024-09-24 | Resolved: 2024-10-24

## 2024-11-07 ENCOUNTER — OFFICE VISIT (OUTPATIENT)
Age: 44
End: 2024-11-07
Payer: COMMERCIAL

## 2024-11-07 VITALS
DIASTOLIC BLOOD PRESSURE: 70 MMHG | OXYGEN SATURATION: 98 % | TEMPERATURE: 97.6 F | SYSTOLIC BLOOD PRESSURE: 120 MMHG | HEIGHT: 67 IN | RESPIRATION RATE: 18 BRPM | HEART RATE: 72 BPM | WEIGHT: 136.8 LBS | BODY MASS INDEX: 21.47 KG/M2

## 2024-11-07 DIAGNOSIS — M54.2 NECK PAIN: Primary | ICD-10-CM

## 2024-11-07 PROCEDURE — 99213 OFFICE O/P EST LOW 20 MIN: CPT

## 2024-11-07 RX ORDER — MELOXICAM 15 MG/1
15 TABLET ORAL DAILY
Qty: 15 TABLET | Refills: 0 | Status: SHIPPED | OUTPATIENT
Start: 2024-11-07

## 2024-11-07 NOTE — PROGRESS NOTES
Assessment/Plan:         Problem List Items Addressed This Visit       Neck pain - Primary     Etiology remains unclear.  Try meloxicam for couple weeks with meals.  May use ice packs.  If not improving or getting worse get back to us otherwise follow-up with Dr. Jackson in couple weeks         Relevant Medications    meloxicam (Mobic) 15 mg tablet         Subjective:      Patient ID: Gauttamkumar Terri is a 44 y.o. male.    Patient here for sick visit patient normally sees Dr. Jackson patient's record reviewed.  Patient complaining of pain at the base of neck on the right side at 1 particular point which is tender he feels like throbbing pain comes and goes no fever no chills no tingling numbness or weakness no headache no earache no upper respiratory symptoms no lightheadedness or dizziness no other complaints        The following portions of the patient's history were reviewed and updated as appropriate:   Past Medical History:  He has a past medical history of Acute cough (09/24/2024), Annual physical exam (07/25/2024), BMI 21.0-21.9, adult (03/02/2021), BMI 21.0-21.9, adult (02/08/2022), Chest pain (09/21/2021), Chest pain, atypical, Diarrhea of presumed infectious origin (09/24/2024), Elevated hemoglobin A1c (07/25/2024), Esophageal reflux, GE reflux (02/28/2021), GERD (gastroesophageal reflux disease), Hypercholesterolemia (02/28/2021), Hypercholesterolemia (02/08/2022), Iron deficiency (02/28/2021), Iron deficiency (02/08/2022), Leukopenia (02/28/2021), Leukopenia (02/08/2022), Lipoma of upper extremity (11/08/2022), Lump of skin (03/02/2021), Lumps on the skin (09/21/2021), Muscle cramps (02/08/2022), Nocturia, Nocturia (07/25/2024), Other male erectile dysfunction (07/25/2024), Pain in both lower legs (09/21/2021), Sore throat (09/24/2024), Urinary urgency, Urinary urgency (07/25/2024), and Vitamin D deficiency  (08/09/2022).,  _______________________________________________________________________  Medical Problems:  does not have any pertinent problems on file.,  _______________________________________________________________________  Past Surgical History:   has a past surgical history that includes No past surgeries; Colonoscopy; and Upper gastrointestinal endoscopy.,  _______________________________________________________________________  Family History:  family history includes Colon cancer in his maternal grandfather and maternal uncle; Coronary artery disease in his father; Stroke in his father.,  _______________________________________________________________________  Social History:   reports that he has never smoked. He has never used smokeless tobacco. He reports that he does not drink alcohol and does not use drugs.,  _______________________________________________________________________  Allergies:  has No Known Allergies..  _______________________________________________________________________  Current Outpatient Medications   Medication Sig Dispense Refill    ascorbic acid (VITAMIN C) 500 mg tablet Take 500 mg by mouth daily      Ferrous Sulfate Dried (High Potency Iron) 65 MG TABS Take by mouth in the morning      meloxicam (Mobic) 15 mg tablet Take 1 tablet (15 mg total) by mouth daily 15 tablet 0    pantoprazole (PROTONIX) 40 mg tablet TAKE 1 TABLET BY MOUTH EVERY DAY (Patient taking differently: Take 40 mg by mouth if needed) 90 tablet 1    simvastatin (ZOCOR) 10 mg tablet TAKE 1 TABLET BY MOUTH EVERYDAY AT BEDTIME 90 tablet 1    Vitamin D, Cholecalciferol, 25 MCG (1000 UT) TABS Take 1 tablet by mouth daily in the early morning       No current facility-administered medications for this visit.     _______________________________________________________________________  Review of Systems   Constitutional:  Negative for chills, fatigue and fever.   HENT:  Negative for congestion, ear pain, rhinorrhea,  "sneezing and sore throat.    Eyes:  Negative for redness, itching and visual disturbance.   Respiratory:  Negative for cough, chest tightness and shortness of breath.    Cardiovascular:  Negative for chest pain, palpitations and leg swelling.   Gastrointestinal:  Negative for abdominal pain, blood in stool, diarrhea, nausea and vomiting.   Endocrine: Negative for cold intolerance and heat intolerance.   Genitourinary:  Negative for dysuria, frequency and urgency.   Musculoskeletal:  Positive for neck pain. Negative for arthralgias, back pain and myalgias.   Skin:  Negative for color change and rash.   Neurological:  Negative for dizziness, weakness, light-headedness, numbness and headaches.   Hematological:  Does not bruise/bleed easily.   Psychiatric/Behavioral:  Negative for agitation, behavioral problems and confusion.          Objective:  Vitals:    11/07/24 0847   BP: 120/70   BP Location: Right arm   Patient Position: Sitting   Cuff Size: Standard   Pulse: 72   Resp: 18   Temp: 97.6 °F (36.4 °C)   TempSrc: Tympanic   SpO2: 98%   Weight: 62.1 kg (136 lb 12.8 oz)   Height: 5' 7\" (1.702 m)     Body mass index is 21.43 kg/m².     Physical Exam  Vitals and nursing note reviewed.   Constitutional:       General: He is not in acute distress.     Appearance: Normal appearance. He is not ill-appearing, toxic-appearing or diaphoretic.   HENT:      Head: Normocephalic and atraumatic.      Nose: Nose normal. No congestion.      Mouth/Throat:      Mouth: Mucous membranes are moist.   Eyes:      General: No scleral icterus.        Right eye: No discharge.         Left eye: No discharge.      Extraocular Movements: Extraocular movements intact.      Conjunctiva/sclera: Conjunctivae normal.      Pupils: Pupils are equal, round, and reactive to light.   Cardiovascular:      Rate and Rhythm: Normal rate and regular rhythm.      Pulses: Normal pulses.      Heart sounds: Normal heart sounds. No murmur heard.     No gallop. "   Pulmonary:      Effort: Pulmonary effort is normal. No respiratory distress.      Breath sounds: Normal breath sounds. No wheezing, rhonchi or rales.   Abdominal:      General: Abdomen is flat. Bowel sounds are normal. There is no distension.      Palpations: Abdomen is soft.      Tenderness: There is no abdominal tenderness. There is no right CVA tenderness, left CVA tenderness or guarding.   Musculoskeletal:         General: Tenderness (Base of the skull on the right side have point tenderness no muscular spasms) present. No swelling. Normal range of motion.      Cervical back: Normal range of motion and neck supple. No rigidity.      Right lower leg: No edema.      Left lower leg: No edema.   Lymphadenopathy:      Cervical: No cervical adenopathy.   Skin:     General: Skin is warm.      Capillary Refill: Capillary refill takes 2 to 3 seconds.      Coloration: Skin is not jaundiced.      Findings: No bruising or rash.   Neurological:      General: No focal deficit present.      Mental Status: He is alert and oriented to person, place, and time. Mental status is at baseline.      Motor: No weakness.      Gait: Gait normal.   Psychiatric:         Mood and Affect: Mood normal.         Behavior: Behavior normal.

## 2024-11-07 NOTE — ASSESSMENT & PLAN NOTE
Etiology remains unclear.  Try meloxicam for couple weeks with meals.  May use ice packs.  If not improving or getting worse get back to us otherwise follow-up with Dr. Jackson in couple weeks

## 2025-01-29 LAB
25(OH)D3 SERPL-MCNC: 39 NG/ML (ref 30–100)
ALBUMIN SERPL-MCNC: 4.1 G/DL (ref 3.6–5.1)
ALBUMIN/GLOB SERPL: 2 (CALC) (ref 1–2.5)
ALP SERPL-CCNC: 52 U/L (ref 36–130)
ALT SERPL-CCNC: 15 U/L (ref 9–46)
AST SERPL-CCNC: 19 U/L (ref 10–40)
BASOPHILS # BLD AUTO: 20 CELLS/UL (ref 0–200)
BASOPHILS NFR BLD AUTO: 0.7 %
BILIRUB SERPL-MCNC: 0.4 MG/DL (ref 0.2–1.2)
BUN SERPL-MCNC: 17 MG/DL (ref 7–25)
BUN/CREAT SERPL: NORMAL (CALC) (ref 6–22)
CALCIUM SERPL-MCNC: 9.3 MG/DL (ref 8.6–10.3)
CHLORIDE SERPL-SCNC: 106 MMOL/L (ref 98–110)
CHOLEST SERPL-MCNC: 177 MG/DL
CHOLEST/HDLC SERPL: 2.7 (CALC)
CO2 SERPL-SCNC: 31 MMOL/L (ref 20–32)
CREAT SERPL-MCNC: 0.97 MG/DL (ref 0.6–1.29)
EOSINOPHIL # BLD AUTO: 62 CELLS/UL (ref 15–500)
EOSINOPHIL NFR BLD AUTO: 2.2 %
ERYTHROCYTE [DISTWIDTH] IN BLOOD BY AUTOMATED COUNT: 12.5 % (ref 11–15)
GFR/BSA.PRED SERPLBLD CYS-BASED-ARV: 98 ML/MIN/1.73M2
GLOBULIN SER CALC-MCNC: 2.1 G/DL (CALC) (ref 1.9–3.7)
GLUCOSE SERPL-MCNC: 94 MG/DL (ref 65–99)
HBA1C MFR BLD: 5.7 % OF TOTAL HGB
HCT VFR BLD AUTO: 43.2 % (ref 38.5–50)
HDLC SERPL-MCNC: 65 MG/DL
HGB BLD-MCNC: 14.4 G/DL (ref 13.2–17.1)
LDLC SERPL CALC-MCNC: 98 MG/DL (CALC)
LYMPHOCYTES # BLD AUTO: 1008 CELLS/UL (ref 850–3900)
LYMPHOCYTES NFR BLD AUTO: 36 %
MCH RBC QN AUTO: 29.2 PG (ref 27–33)
MCHC RBC AUTO-ENTMCNC: 33.3 G/DL (ref 32–36)
MCV RBC AUTO: 87.6 FL (ref 80–100)
MONOCYTES # BLD AUTO: 283 CELLS/UL (ref 200–950)
MONOCYTES NFR BLD AUTO: 10.1 %
NEUTROPHILS # BLD AUTO: 1428 CELLS/UL (ref 1500–7800)
NEUTROPHILS NFR BLD AUTO: 51 %
NONHDLC SERPL-MCNC: 112 MG/DL (CALC)
PLATELET # BLD AUTO: 183 THOUSAND/UL (ref 140–400)
PMV BLD REES-ECKER: 9.5 FL (ref 7.5–12.5)
POTASSIUM SERPL-SCNC: 3.9 MMOL/L (ref 3.5–5.3)
PROT SERPL-MCNC: 6.2 G/DL (ref 6.1–8.1)
RBC # BLD AUTO: 4.93 MILLION/UL (ref 4.2–5.8)
SODIUM SERPL-SCNC: 141 MMOL/L (ref 135–146)
TRIGL SERPL-MCNC: 54 MG/DL
WBC # BLD AUTO: 2.8 THOUSAND/UL (ref 3.8–10.8)

## 2025-01-30 ENCOUNTER — OFFICE VISIT (OUTPATIENT)
Dept: INTERNAL MEDICINE CLINIC | Facility: CLINIC | Age: 45
End: 2025-01-30
Payer: COMMERCIAL

## 2025-01-30 VITALS
DIASTOLIC BLOOD PRESSURE: 72 MMHG | BODY MASS INDEX: 21.5 KG/M2 | SYSTOLIC BLOOD PRESSURE: 108 MMHG | HEART RATE: 89 BPM | HEIGHT: 67 IN | TEMPERATURE: 98 F | OXYGEN SATURATION: 98 % | WEIGHT: 137 LBS | RESPIRATION RATE: 18 BRPM

## 2025-01-30 DIAGNOSIS — E55.9 VITAMIN D DEFICIENCY: ICD-10-CM

## 2025-01-30 DIAGNOSIS — Z79.899 HIGH RISK MEDICATION USE: ICD-10-CM

## 2025-01-30 DIAGNOSIS — R60.0 EDEMA OF BOTH LEGS: ICD-10-CM

## 2025-01-30 DIAGNOSIS — E61.1 IRON DEFICIENCY: ICD-10-CM

## 2025-01-30 DIAGNOSIS — E78.00 HYPERCHOLESTEROLEMIA: ICD-10-CM

## 2025-01-30 DIAGNOSIS — R25.2 MUSCLE CRAMPS: ICD-10-CM

## 2025-01-30 DIAGNOSIS — K21.9 GASTROESOPHAGEAL REFLUX DISEASE WITHOUT ESOPHAGITIS: ICD-10-CM

## 2025-01-30 DIAGNOSIS — R73.03 PRE-DIABETES: ICD-10-CM

## 2025-01-30 DIAGNOSIS — D72.818 OTHER DECREASED WHITE BLOOD CELL (WBC) COUNT: Primary | ICD-10-CM

## 2025-01-30 PROCEDURE — 99214 OFFICE O/P EST MOD 30 MIN: CPT | Performed by: INTERNAL MEDICINE

## 2025-01-30 NOTE — ASSESSMENT & PLAN NOTE
Hemoglobin normal.  He takes iron therapy.  Will follow hemoglobin and ferritin count.  Orders:  •  CBC and differential; Future  •  Ferritin; Future

## 2025-01-30 NOTE — ASSESSMENT & PLAN NOTE
Patient noticed edema of both lower extremities mainly the day he works and on his feet for 8 to 10 hours.  Most likely dependent edema.  Will Check TSH.  He has been wearing tight socks advised to wear the low-cut socks to avoid compression.  Orders:  •  TSH, 3rd generation; Future

## 2025-01-30 NOTE — ASSESSMENT & PLAN NOTE
Muscle cramps are all better since she is taking iron therapy as well as vitamin D supplement.  Orders:  •  Comprehensive metabolic panel; Future  •  CBC and differential; Future

## 2025-01-30 NOTE — PROGRESS NOTES
Name: Reggie Murray      : 1980      MRN: [id number]  Encounter Provider: Naldo Jackson MD  Encounter Date: 2025   Encounter department: [facility]  :  Assessment & Plan  Other decreased white blood cell (WBC) count  WBC 2.8.  In 2019 his WBC was 2.9.  Had seen hematology in the past.  Will observe for now if persistently decreasing will refer to hematologist.  Patient agreed with the plan  Orders:  •  CBC and differential; Future    Pre-diabetes  Hemoglobin A1c decreased from 5.8-5.7 advised to continue low sugar and low carbs diet.  Orders:  •  Comprehensive metabolic panel; Future    Vitamin D deficiency  Vitamin D deficient resolved on vitamin D supplement.  Vitamin D level 39 advised to continue same supplement.       Gastroesophageal reflux disease without esophagitis  Symptoms are well-controlled on diet and occasionally takes pantoprazole.       Iron deficiency  Hemoglobin normal.  He takes iron therapy.  Will follow hemoglobin and ferritin count.  Orders:  •  CBC and differential; Future  •  Ferritin; Future    Muscle cramps  Muscle cramps are all better since she is taking iron therapy as well as vitamin D supplement.  Orders:  •  Comprehensive metabolic panel; Future  •  CBC and differential; Future    Hypercholesterolemia  Well-controlled.  Cholesterol 177, triglyceride 54, HDL 65, LDL 98 advised to continue present dose of simvastatin and low-cholesterol low carbs diet  Orders:  •  Lipid panel; Future  •  Comprehensive metabolic panel; Future  •  TSH, 3rd generation; Future    High risk medication use    Orders:  •  Comprehensive metabolic panel; Future    Edema of both legs  Patient noticed edema of both lower extremities mainly the day he works and on his feet for 8 to 10 hours.  Most likely dependent edema.  Will Check TSH.  He has been wearing tight socks advised to wear the low-cut socks to avoid compression.  Orders:  •  TSH, 3rd  generation; Future           History of Present Illness   HPI  44-year-old male patient present for follow-up his medical problems.      Current Outpatient Medications:   •  ascorbic acid (VITAMIN C) 500 mg tablet, Take 500 mg by mouth daily, Disp: , Rfl:   •  Ferrous Sulfate Dried (High Potency Iron) 65 MG TABS, Take by mouth in the morning, Disp: , Rfl:   •  pantoprazole (PROTONIX) 40 mg tablet, TAKE 1 TABLET BY MOUTH EVERY DAY (Patient taking differently: Take 40 mg by mouth if needed), Disp: 90 tablet, Rfl: 1  •  simvastatin (ZOCOR) 10 mg tablet, TAKE 1 TABLET BY MOUTH EVERYDAY AT BEDTIME, Disp: 90 tablet, Rfl: 1  •  Vitamin D, Cholecalciferol, 25 MCG (1000 UT) TABS, Take 1 tablet by mouth daily in the early morning, Disp: , Rfl:      Past Medical History:   Diagnosis Date   • Acute cough 09/24/2024   • Annual physical exam 07/25/2024   • BMI 21.0-21.9, adult 03/02/2021   • BMI 21.0-21.9, adult 02/08/2022   • Chest pain 09/21/2021   • Chest pain, atypical    • Diarrhea of presumed infectious origin 09/24/2024   • Edema of both legs 01/30/2025   • Elevated hemoglobin A1c 07/25/2024   • Esophageal reflux    • GE reflux 02/28/2021   • GERD (gastroesophageal reflux disease)    • Hypercholesterolemia 02/28/2021   • Hypercholesterolemia 02/08/2022   • Iron deficiency 02/28/2021   • Iron deficiency 02/08/2022   • Leukopenia 02/28/2021   • Leukopenia 02/08/2022   • Lipoma of upper extremity 11/08/2022   • Lump of skin 03/02/2021   • Lumps on the skin 09/21/2021   • Muscle cramps 02/08/2022   • Nocturia    • Nocturia 07/25/2024   • Other male erectile dysfunction 07/25/2024   • Pain in both lower legs 09/21/2021   • Pre-diabetes 01/30/2025   • Sore throat 09/24/2024   • Urinary urgency    • Urinary urgency 07/25/2024   • Vitamin D deficiency 08/09/2022      Review of Systems   Constitutional:  Negative for chills, fatigue and fever.   HENT:  Negative for congestion, ear pain, hearing loss, nosebleeds, sinus pain, sore  "throat and trouble swallowing.    Eyes:  Negative for discharge, redness and visual disturbance.   Respiratory:  Negative for cough, chest tightness and shortness of breath.    Cardiovascular:  Positive for leg swelling. Negative for chest pain and palpitations.   Gastrointestinal:  Negative for abdominal pain, blood in stool, constipation, diarrhea, nausea and vomiting.   Genitourinary:  Negative for dysuria, flank pain, frequency and hematuria.   Musculoskeletal:  Negative for arthralgias, myalgias and neck pain.   Skin:  Negative for color change and rash.   Neurological:  Negative for dizziness, speech difficulty, weakness and headaches.   Hematological:  Does not bruise/bleed easily.   Psychiatric/Behavioral:  Negative for agitation and behavioral problems.          Objective   /72 (BP Location: Left arm, Patient Position: Sitting, Cuff Size: Standard)   Pulse 89   Temp 98 °F (36.7 °C) (Temporal)   Resp 18   Ht 5' 7\" (1.702 m)   Wt 62.1 kg (137 lb)   SpO2 98%   BMI 21.46 kg/m²      Physical Exam  Vitals and nursing note reviewed.   Constitutional:       General: He is not in acute distress.     Appearance: Normal appearance. He is well-developed.   HENT:      Head: Normocephalic and atraumatic.      Right Ear: External ear normal.      Left Ear: External ear normal.      Nose: Nose normal.      Mouth/Throat:      Mouth: Mucous membranes are moist.      Pharynx: Oropharynx is clear.   Eyes:      General: No scleral icterus.        Right eye: No discharge.         Left eye: No discharge.      Extraocular Movements: Extraocular movements intact.      Conjunctiva/sclera: Conjunctivae normal.   Cardiovascular:      Rate and Rhythm: Normal rate and regular rhythm.      Pulses: Normal pulses.      Heart sounds: Normal heart sounds. No murmur heard.  Pulmonary:      Effort: Pulmonary effort is normal. No respiratory distress.      Breath sounds: Normal breath sounds. No wheezing, rhonchi or rales. "   Abdominal:      General: Bowel sounds are normal. There is no distension.      Palpations: Abdomen is soft.      Tenderness: There is no abdominal tenderness.   Musculoskeletal:         General: No swelling. Normal range of motion.      Cervical back: Normal range of motion and neck supple.      Right lower leg: No edema.      Left lower leg: No edema.   Skin:     General: Skin is warm and dry.      Capillary Refill: Capillary refill takes less than 2 seconds.      Findings: No rash.   Neurological:      General: No focal deficit present.      Mental Status: He is alert and oriented to person, place, and time.      Cranial Nerves: No cranial nerve deficit.      Sensory: No sensory deficit.   Psychiatric:         Mood and Affect: Mood normal.         Behavior: Behavior normal.         Thought Content: Thought content normal.         Judgment: Judgment normal.

## 2025-01-30 NOTE — PATIENT INSTRUCTIONS
Patient was advised to continue present medications. discussed with the patient medications and laboratory data in detail.  Follow-up with me in 6 months or as advised.  If any blood test was ordered please do 1 week prior to next appointment unless advise to get earlier.  If you have any questions please call the office [phone]

## 2025-01-30 NOTE — ASSESSMENT & PLAN NOTE
Vitamin D deficient resolved on vitamin D supplement.  Vitamin D level 39 advised to continue same supplement.

## 2025-01-30 NOTE — ASSESSMENT & PLAN NOTE
WBC 2.8.  In 2019 his WBC was 2.9.  Had seen hematology in the past.  Will observe for now if persistently decreasing will refer to hematologist.  Patient agreed with the plan  Orders:  •  CBC and differential; Future

## 2025-01-30 NOTE — ASSESSMENT & PLAN NOTE
Well-controlled.  Cholesterol 177, triglyceride 54, HDL 65, LDL 98 advised to continue present dose of simvastatin and low-cholesterol low carbs diet  Orders:  •  Lipid panel; Future  •  Comprehensive metabolic panel; Future  •  TSH, 3rd generation; Future

## 2025-01-30 NOTE — ASSESSMENT & PLAN NOTE
Hemoglobin A1c decreased from 5.8-5.7 advised to continue low sugar and low carbs diet.  Orders:  •  Comprehensive metabolic panel; Future

## 2025-03-06 DIAGNOSIS — E78.00 HYPERCHOLESTEROLEMIA: ICD-10-CM

## 2025-03-06 RX ORDER — SIMVASTATIN 10 MG
10 TABLET ORAL
Qty: 90 TABLET | Refills: 1 | Status: SHIPPED | OUTPATIENT
Start: 2025-03-06

## 2025-03-06 NOTE — TELEPHONE ENCOUNTER
Reason for call:   [x] Refill   [] Prior Auth  [] Other:     Office:   [x] PCP/Provider - Naldo Jackson MD   [] Specialty/Provider -     Medication: simvastatin (ZOCOR) 10 mg tablet / TAKE 1 TABLET BY MOUTH EVERYDAY AT BEDTIME    Pharmacy: [facility]/pharmacy #[id number] [address] [facility]   Does the patient have enough for 3 days?   [] Yes   [x] No - Send as HP to POD

## 2025-07-30 LAB
ALBUMIN SERPL-MCNC: 4.3 G/DL (ref 3.6–5.1)
ALBUMIN/GLOB SERPL: 2 (CALC) (ref 1–2.5)
ALP SERPL-CCNC: 58 U/L (ref 36–130)
ALT SERPL-CCNC: 13 U/L (ref 9–46)
AST SERPL-CCNC: 16 U/L (ref 10–40)
BASOPHILS # BLD AUTO: 20 CELLS/UL (ref 0–200)
BASOPHILS NFR BLD AUTO: 0.6 %
BILIRUB SERPL-MCNC: 0.8 MG/DL (ref 0.2–1.2)
BUN SERPL-MCNC: 22 MG/DL (ref 7–25)
BUN/CREAT SERPL: NORMAL (CALC) (ref 6–22)
CALCIUM SERPL-MCNC: 9.1 MG/DL (ref 8.6–10.3)
CHLORIDE SERPL-SCNC: 103 MMOL/L (ref 98–110)
CHOLEST SERPL-MCNC: 187 MG/DL
CHOLEST/HDLC SERPL: 2.8 (CALC)
CO2 SERPL-SCNC: 29 MMOL/L (ref 20–32)
CREAT SERPL-MCNC: 1.05 MG/DL (ref 0.6–1.29)
EOSINOPHIL # BLD AUTO: 79 CELLS/UL (ref 15–500)
EOSINOPHIL NFR BLD AUTO: 2.4 %
ERYTHROCYTE [DISTWIDTH] IN BLOOD BY AUTOMATED COUNT: 12.9 % (ref 11–15)
FERRITIN SERPL-MCNC: 42 NG/ML (ref 38–380)
GFR/BSA.PRED SERPLBLD CYS-BASED-ARV: 89 ML/MIN/1.73M2
GLOBULIN SER CALC-MCNC: 2.2 G/DL (CALC) (ref 1.9–3.7)
GLUCOSE SERPL-MCNC: 99 MG/DL (ref 65–99)
HCT VFR BLD AUTO: 47.5 % (ref 38.5–50)
HDLC SERPL-MCNC: 67 MG/DL
HGB BLD-MCNC: 15.6 G/DL (ref 13.2–17.1)
LDLC SERPL CALC-MCNC: 105 MG/DL (CALC)
LYMPHOCYTES # BLD AUTO: 1307 CELLS/UL (ref 850–3900)
LYMPHOCYTES NFR BLD AUTO: 39.6 %
MCH RBC QN AUTO: 29.1 PG (ref 27–33)
MCHC RBC AUTO-ENTMCNC: 32.8 G/DL (ref 32–36)
MCV RBC AUTO: 88.6 FL (ref 80–100)
MONOCYTES # BLD AUTO: 380 CELLS/UL (ref 200–950)
MONOCYTES NFR BLD AUTO: 11.5 %
NEUTROPHILS # BLD AUTO: 1515 CELLS/UL (ref 1500–7800)
NEUTROPHILS NFR BLD AUTO: 45.9 %
NONHDLC SERPL-MCNC: 120 MG/DL (CALC)
PLATELET # BLD AUTO: 188 THOUSAND/UL (ref 140–400)
PMV BLD REES-ECKER: 9.4 FL (ref 7.5–12.5)
POTASSIUM SERPL-SCNC: 4 MMOL/L (ref 3.5–5.3)
PROT SERPL-MCNC: 6.5 G/DL (ref 6.1–8.1)
RBC # BLD AUTO: 5.36 MILLION/UL (ref 4.2–5.8)
SODIUM SERPL-SCNC: 138 MMOL/L (ref 135–146)
TRIGL SERPL-MCNC: 65 MG/DL
TSH SERPL-ACNC: 0.94 MIU/L (ref 0.4–4.5)
WBC # BLD AUTO: 3.3 THOUSAND/UL (ref 3.8–10.8)

## 2025-07-31 ENCOUNTER — OFFICE VISIT (OUTPATIENT)
Dept: INTERNAL MEDICINE CLINIC | Facility: CLINIC | Age: 45
End: 2025-07-31
Payer: COMMERCIAL

## 2025-07-31 VITALS
RESPIRATION RATE: 16 BRPM | HEART RATE: 76 BPM | SYSTOLIC BLOOD PRESSURE: 104 MMHG | WEIGHT: 139 LBS | DIASTOLIC BLOOD PRESSURE: 70 MMHG | HEIGHT: 67 IN | TEMPERATURE: 98.7 F | OXYGEN SATURATION: 98 % | BODY MASS INDEX: 21.82 KG/M2

## 2025-07-31 DIAGNOSIS — R73.03 PRE-DIABETES: ICD-10-CM

## 2025-07-31 DIAGNOSIS — Z79.899 HIGH RISK MEDICATION USE: ICD-10-CM

## 2025-07-31 DIAGNOSIS — E61.1 IRON DEFICIENCY: ICD-10-CM

## 2025-07-31 DIAGNOSIS — E78.00 HYPERCHOLESTEROLEMIA: Primary | ICD-10-CM

## 2025-07-31 DIAGNOSIS — E55.9 VITAMIN D DEFICIENCY: ICD-10-CM

## 2025-07-31 PROCEDURE — 99213 OFFICE O/P EST LOW 20 MIN: CPT | Performed by: INTERNAL MEDICINE

## 2025-07-31 RX ORDER — SIMVASTATIN 10 MG
10 TABLET ORAL
Qty: 90 TABLET | Refills: 1 | Status: SHIPPED | OUTPATIENT
Start: 2025-07-31